# Patient Record
Sex: MALE | Race: WHITE | NOT HISPANIC OR LATINO | Employment: FULL TIME | URBAN - METROPOLITAN AREA
[De-identification: names, ages, dates, MRNs, and addresses within clinical notes are randomized per-mention and may not be internally consistent; named-entity substitution may affect disease eponyms.]

---

## 2019-03-05 ENCOUNTER — OFFICE VISIT (OUTPATIENT)
Dept: GASTROENTEROLOGY | Facility: CLINIC | Age: 49
End: 2019-03-05
Payer: COMMERCIAL

## 2019-03-05 VITALS
SYSTOLIC BLOOD PRESSURE: 100 MMHG | DIASTOLIC BLOOD PRESSURE: 70 MMHG | HEIGHT: 70 IN | HEART RATE: 87 BPM | RESPIRATION RATE: 18 BRPM | WEIGHT: 209 LBS | BODY MASS INDEX: 29.92 KG/M2

## 2019-03-05 DIAGNOSIS — R11.0 NAUSEA: ICD-10-CM

## 2019-03-05 DIAGNOSIS — K62.5 RECTAL BLEEDING: ICD-10-CM

## 2019-03-05 DIAGNOSIS — R10.30 LOWER ABDOMINAL PAIN: Primary | ICD-10-CM

## 2019-03-05 DIAGNOSIS — K92.1 MELENA: ICD-10-CM

## 2019-03-05 DIAGNOSIS — R19.5 LOOSE STOOLS: ICD-10-CM

## 2019-03-05 PROBLEM — R10.32 LLQ PAIN: Status: ACTIVE | Noted: 2019-03-05

## 2019-03-05 PROCEDURE — 99244 OFF/OP CNSLTJ NEW/EST MOD 40: CPT | Performed by: INTERNAL MEDICINE

## 2019-03-05 RX ORDER — MELOXICAM 15 MG/1
15 TABLET ORAL DAILY
Refills: 1 | COMMUNITY
Start: 2019-02-04 | End: 2019-07-17

## 2019-03-05 NOTE — LETTER
March 5, 2019     David Underwood DO  [address]  [facility] [address]    Patient: Stefania Smith Sr   YOB: 1970   Date of Visit: 3/5/2019       Dear Dr Grecia Martínez: Thank you for referring Beka Hernandez Sr to me for evaluation  Below are my notes for this consultation  If you have questions, please do not hesitate to call me  I look forward to following your patient along with you  Sincerely,        Ted Sifuentes MD        CC: No Recipients  Ted Sifuentes MD  3/5/2019  3:56 PM  Incomplete  [facility]    Dear   Dr Grecia Martínez,    I had the pleasure of seeing your patient Nanette Smith in the office today and I thank you for this kind referral        Chief Complaint:  Abdominal pain and  bleeding      HPI:  Nanette Smith is a 54 y o  male who presents with  A history of rectal bleeding and abdominal pain  According to the patient his GI history actually goes back to most of his life  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  He was scoped 11 years ago and told to have probable IBS  He did not take the medications that will offer him  He has frequent and longstanding intermittent loose stool as well as chronic nausea  These do appear to be diet related, especially coffee and such substances  However on Christmas the patient had a left lower quadrant and left mid quadrant nonradiating pain  On new year's Day he passed a large blood clot rectally  Patient does not have any dysphagia, nausea or vomiting, weight loss, change in bowel habits, or any other definitive exacerbating or remitting factors for his discomfort  The patient has had melanotic stool on occasion  He does take ibuprofen  He has no other significant GI complaints  No studies have been done quite some time  Review of Systems:   Constitutional: No fever or chills, feels well, no tiredness, no recent weight gain or weight loss     HENT: No complaints of earache, no hearing loss, no nosebleeds, no nasal discharge, no sore throat, no hoarseness  Eyes: No complaints of eye pain, no red eyes, no discharge from eyes, no itchy eyes  Cardiovascular: No complaints of slow heart rate, no fast heart rate, no chest pain, no palpitations, no leg claudication, no lower extremity edema  Respiratory: No complaints of shortness of breath, no wheezing, no cough, no SOB on exertion, no orthopnea  Gastrointestinal: As noted in HPI  Genitourinary: No complaints of dysuria, no incontinence, no hesitancy, no nocturia  Musculoskeletal:   Positive arthralgia, no myalgias, no joint swelling or stiffness, no limb pain or swelling  Neurological: No complaints of headache, no confusion, no convulsions, no numbness or tingling, no dizziness or fainting, no limb weakness, no difficulty walking  Skin: No complaints of skin rash or skin lesions, no itching, no skin wound, no dry skin  Hematological/Lymphatic: No complaints of swollen glands, does not bleed easy  Allergic/Immunologic: No immunocompromised state  Endocrine:  No complaints of polyuria, no polydipsia  Psychiatric/Behavioral: is not suicidal, no sleep disturbances, no anxiety or depression, no change in personality, no emotional problems         Historical Information   Past Medical History:   Diagnosis Date    Asthma      Past Surgical History:   Procedure Laterality Date    ANKLE SURGERY      LEG SURGERY       Social History   Social History     Substance and Sexual Activity   Alcohol Use Never    Frequency: Never     Social History     Substance and Sexual Activity   Drug Use Never     Social History     Tobacco Use   Smoking Status Current Every Day Smoker   Smokeless Tobacco Never Used     Family History   Problem Relation Age of Onset    Parkinsonism Mother     Diabetes Father     Breast cancer Sister     Lung cancer Sister          Current Medications: has a current medication list which includes the following prescription(s): meloxicam        Vital Signs: /70   Pulse 87   Resp 18   Ht 5' 10" (1 778 m)   Wt 94 8 kg (209 lb)   BMI 29 99 kg/m²      Physical Exam:   Constitutional  General Appearance: No acute distress, well appearing and well nourished  Head  Normocephalic  Eyes  Conjunctivae and lids: No swelling, erythema, or discharge  Pupils and irises: Equal, round and reactive to light  Ears, Nose, Mouth, and Throat  External inspection of ears and nose: Normal  Nasal mucosa, septum and turbinates: Normal without edema or erythema/   Oropharynx: Normal with no erythema, edema, exudate or lesions  Neck  Normal range of motion  Neck supple  Cardiovascular  Auscultation of the heart: Normal rate and rhythm, normal S1 and S2 without murmurs  Examination of the extremities for edema and/or varicosities: Normal  Pulmonary/Chest  Respiratory effort: No increased work of breathing or signs of respiratory distress  Auscultation of lungs: Clear to auscultation, equal breath sounds bilaterally, no wheezes, rales, no rhonchi  Abdomen  Abdomen: Non-tender, no masses  Liver and spleen: No hepatomegaly or splenomegaly  Musculoskeletal  Gait and station: normal   Digits and Nails: normal without clubbing or cyanosis  Inspection/palpation of joints, bones, and muscles: Normal  Neurological  No nystagmus or asterixis  Skin  Skin and subcutaneous tissue: Normal without rashes or lesions  Lymphatic  Palpation of the lymph nodes in neck: No lymphadenopathy     Psychiatric  Orientation to person, place and time: Normal   Mood and affect: Normal          Labs:   No results found for: ALT, AST, BUN, CALCIUM, CL, CHOL, CO2, CREATININE, GFRAA, GFRNONAA, HDL, HCT, HGB, HGBA1C, LDL, MG, PHOS, PLT, K, PSA, NA, TRIG, WBC      X-Rays & Procedures:   No orders to display         ______________________________________________________________________      Assessment & Plan:      Amarjit Oconnor was seen today for screening colonoscopy, abdominal pain and rectal bleeding  Diagnoses and all orders for this visit:    Lower abdominal pain    Rectal bleeding    Nausea    Melena    Loose stools       I have taken liberty of scheduling the patient for both EGD and colonoscopy  I will be happy to inform you of his results and any further recommendations  I would like to thank you for allowing me to participate in his care                With warmest regards,    Georges Darden MD, [facility]

## 2019-03-05 NOTE — PROGRESS NOTES
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  He was scoped 11 years ago and told to have probable IBS  He did not take the medications that will offer him  He has frequent and longstanding intermittent loose stool as well as chronic nausea  These do appear to be diet related, especially coffee and such substances  However on Christmas the patient had a left lower quadrant and left mid quadrant nonradiating pain  On new year's Day he passed a large blood clot rectally  Patient does not have any dysphagia, nausea or vomiting, weight loss, change in bowel habits, or any other definitive exacerbating or remitting factors for his discomfort  The patient has had melanotic stool on occasion  He does take ibuprofen  He has no other significant GI complaints  No studies have been done quite some time  Review of Systems:   Constitutional: No fever or chills, feels well, no tiredness, no recent weight gain or weight loss  HENT: No complaints of earache, no hearing loss, no nosebleeds, no nasal discharge, no sore throat, no hoarseness  Eyes: No complaints of eye pain, no red eyes, no discharge from eyes, no itchy eyes  Cardiovascular: No complaints of slow heart rate, no fast heart rate, no chest pain, no palpitations, no leg claudication, no lower extremity edema  Respiratory: No complaints of shortness of breath, no wheezing, no cough, no SOB on exertion, no orthopnea     Gastrointestinal: As noted in HPI  Genitourinary: No complaints of dysuria, no incontinence, no hesitancy, no nocturia  Musculoskeletal:   Positive arthralgia, no myalgias, no joint swelling or stiffness, no limb pain or swelling  Neurological: No complaints of headache, no confusion, no convulsions, no numbness or tingling, no dizziness or fainting, no limb weakness, no difficulty walking  Skin: No complaints of skin rash or skin lesions, no itching, no skin wound, no dry skin  Hematological/Lymphatic: No complaints of swollen glands, does not bleed easy  Allergic/Immunologic: No immunocompromised state  Endocrine:  No complaints of polyuria, no polydipsia  Psychiatric/Behavioral: is not suicidal, no sleep disturbances, no anxiety or depression, no change in personality, no emotional problems  Historical Information   Past Medical History:   Diagnosis Date    Asthma      Past Surgical History:   Procedure Laterality Date    ANKLE SURGERY      LEG SURGERY       Social History   Social History     Substance and Sexual Activity   Alcohol Use Never    Frequency: Never     Social History     Substance and Sexual Activity   Drug Use Never     Social History     Tobacco Use   Smoking Status Current Every Day Smoker   Smokeless Tobacco Never Used     Family History   Problem Relation Age of Onset    Parkinsonism Mother     Diabetes Father     Breast cancer Sister     Lung cancer Sister          Current Medications: has a current medication list which includes the following prescription(s): meloxicam        Vital Signs: /70   Pulse 87   Resp 18   Ht 5' 10" (1 778 m)   Wt 94 8 kg (209 lb)   BMI 29 99 kg/m²     Physical Exam:   Constitutional  General Appearance: No acute distress, well appearing and well nourished  Head  Normocephalic  Eyes  Conjunctivae and lids: No swelling, erythema, or discharge  Pupils and irises: Equal, round and reactive to light     Ears, Nose, Mouth, and Throat  External inspection of ears and nose: Normal  Nasal mucosa, septum and turbinates: Normal without edema or erythema/   Oropharynx: Normal with no erythema, edema, exudate or lesions  Neck  Normal range of motion  Neck supple  Cardiovascular  Auscultation of the heart: Normal rate and rhythm, normal S1 and S2 without murmurs  Examination of the extremities for edema and/or varicosities: Normal  Pulmonary/Chest  Respiratory effort: No increased work of breathing or signs of respiratory distress  Auscultation of lungs: Clear to auscultation, equal breath sounds bilaterally, no wheezes, rales, no rhonchi  Abdomen  Abdomen: Non-tender, no masses  Liver and spleen: No hepatomegaly or splenomegaly  Musculoskeletal  Gait and station: normal   Digits and Nails: normal without clubbing or cyanosis  Inspection/palpation of joints, bones, and muscles: Normal  Neurological  No nystagmus or asterixis  Skin  Skin and subcutaneous tissue: Normal without rashes or lesions  Lymphatic  Palpation of the lymph nodes in neck: No lymphadenopathy  Psychiatric  Orientation to person, place and time: Normal   Mood and affect: Normal          Labs:   No results found for: ALT, AST, BUN, CALCIUM, CL, CHOL, CO2, CREATININE, GFRAA, GFRNONAA, HDL, HCT, HGB, HGBA1C, LDL, MG, PHOS, PLT, K, PSA, NA, TRIG, WBC      X-Rays & Procedures:   No orders to display         ______________________________________________________________________      Assessment & Plan:      Zainab Malik was seen today for screening colonoscopy, abdominal pain and rectal bleeding  Diagnoses and all orders for this visit:    Lower abdominal pain    Rectal bleeding    Nausea    Melena    Loose stools       I have taken liberty of scheduling the patient for both EGD and colonoscopy  I will be happy to inform you of his results and any further recommendations  I would like to thank you for allowing me to participate in his care                With warmest regards,    Naga Pennington MD, [facility]

## 2019-03-07 ENCOUNTER — TELEPHONE (OUTPATIENT)
Dept: GASTROENTEROLOGY | Facility: CLINIC | Age: 49
End: 2019-03-07

## 2019-03-07 NOTE — TELEPHONE ENCOUNTER
Pt's wife Ruston called stating that since the pt saw Dr Dre Herrera on Tuesday 03/05 the pt's abdominal pain has become worse to the point where he is having nausea and vomiting   He wanted to know if there is something otc he can [phone]

## 2019-03-08 ENCOUNTER — APPOINTMENT (EMERGENCY)
Dept: CT IMAGING | Facility: HOSPITAL | Age: 49
DRG: 392 | End: 2019-03-08
Payer: COMMERCIAL

## 2019-03-08 ENCOUNTER — TELEPHONE (OUTPATIENT)
Dept: GASTROENTEROLOGY | Facility: CLINIC | Age: 49
End: 2019-03-08

## 2019-03-08 ENCOUNTER — HOSPITAL ENCOUNTER (INPATIENT)
Facility: HOSPITAL | Age: 49
LOS: 4 days | Discharge: HOME | DRG: 392 | End: 2019-03-12
Attending: EMERGENCY MEDICINE | Admitting: SURGERY
Payer: COMMERCIAL

## 2019-03-08 DIAGNOSIS — K66.8 PERITONEAL FREE AIR: ICD-10-CM

## 2019-03-08 DIAGNOSIS — K57.20 PERFORATED DIVERTICULUM OF LARGE INTESTINE: Primary | ICD-10-CM

## 2019-03-08 LAB
ABO GROUP BLD: NORMAL
ALBUMIN SERPL BCP-MCNC: 3.8 G/DL (ref 3.5–5)
ALP SERPL-CCNC: 92 U/L (ref 46–116)
ALT SERPL W P-5'-P-CCNC: 37 U/L (ref 12–78)
ANION GAP SERPL CALCULATED.3IONS-SCNC: 10 MMOL/L (ref 4–13)
APTT PPP: 33 SECONDS (ref 26–38)
AST SERPL W P-5'-P-CCNC: 15 U/L (ref 5–45)
BACTERIA UR QL AUTO: ABNORMAL /HPF
BASOPHILS # BLD AUTO: 0.03 THOUSANDS/ΜL (ref 0–0.1)
BASOPHILS NFR BLD AUTO: 0 % (ref 0–1)
BILIRUB SERPL-MCNC: 1.4 MG/DL (ref 0.2–1)
BILIRUB UR QL STRIP: NEGATIVE
BLD GP AB SCN SERPL QL: NEGATIVE
BUN SERPL-MCNC: 21 MG/DL (ref 5–25)
CALCIUM SERPL-MCNC: 9.2 MG/DL (ref 8.3–10.1)
CHLORIDE SERPL-SCNC: 97 MMOL/L (ref 100–108)
CLARITY UR: CLEAR
CO2 SERPL-SCNC: 27 MMOL/L (ref 21–32)
COLOR UR: YELLOW
CREAT SERPL-MCNC: 1.15 MG/DL (ref 0.6–1.3)
EOSINOPHIL # BLD AUTO: 0.01 THOUSAND/ΜL (ref 0–0.61)
EOSINOPHIL NFR BLD AUTO: 0 % (ref 0–6)
ERYTHROCYTE [DISTWIDTH] IN BLOOD BY AUTOMATED COUNT: 13 % (ref 11.6–15.1)
GFR SERPL CREATININE-BSD FRML MDRD: 75 ML/MIN/1.73SQ M
GLUCOSE SERPL-MCNC: 108 MG/DL (ref 65–140)
GLUCOSE UR STRIP-MCNC: NEGATIVE MG/DL
HCT VFR BLD AUTO: 44.8 % (ref 36.5–49.3)
HGB BLD-MCNC: 15.2 G/DL (ref 12–17)
HGB UR QL STRIP.AUTO: ABNORMAL
IMM GRANULOCYTES # BLD AUTO: 0.06 THOUSAND/UL (ref 0–0.2)
IMM GRANULOCYTES NFR BLD AUTO: 0 % (ref 0–2)
INR PPP: 1.24 (ref 0.86–1.17)
KETONES UR STRIP-MCNC: ABNORMAL MG/DL
LACTATE SERPL-SCNC: 1.2 MMOL/L (ref 0.5–2)
LEUKOCYTE ESTERASE UR QL STRIP: NEGATIVE
LIPASE SERPL-CCNC: 54 U/L (ref 73–393)
LYMPHOCYTES # BLD AUTO: 2.05 THOUSANDS/ΜL (ref 0.6–4.47)
LYMPHOCYTES NFR BLD AUTO: 12 % (ref 14–44)
MCH RBC QN AUTO: 30.1 PG (ref 26.8–34.3)
MCHC RBC AUTO-ENTMCNC: 33.9 G/DL (ref 31.4–37.4)
MCV RBC AUTO: 89 FL (ref 82–98)
MONOCYTES # BLD AUTO: 1.42 THOUSAND/ΜL (ref 0.17–1.22)
MONOCYTES NFR BLD AUTO: 8 % (ref 4–12)
MUCOUS THREADS UR QL AUTO: ABNORMAL
NEUTROPHILS # BLD AUTO: 14.32 THOUSANDS/ΜL (ref 1.85–7.62)
NEUTS SEG NFR BLD AUTO: 80 % (ref 43–75)
NITRITE UR QL STRIP: POSITIVE
NON-SQ EPI CELLS URNS QL MICRO: ABNORMAL /HPF
NRBC BLD AUTO-RTO: 0 /100 WBCS
PH UR STRIP.AUTO: 6.5 [PH]
PLATELET # BLD AUTO: 318 THOUSANDS/UL (ref 149–390)
PMV BLD AUTO: 9.4 FL (ref 8.9–12.7)
POTASSIUM SERPL-SCNC: 4 MMOL/L (ref 3.5–5.3)
PROT SERPL-MCNC: 8.1 G/DL (ref 6.4–8.2)
PROT UR STRIP-MCNC: ABNORMAL MG/DL
PROTHROMBIN TIME: 15.4 SECONDS (ref 11.8–14.2)
RBC # BLD AUTO: 5.05 MILLION/UL (ref 3.88–5.62)
RBC #/AREA URNS AUTO: ABNORMAL /HPF
RH BLD: POSITIVE
SODIUM SERPL-SCNC: 134 MMOL/L (ref 136–145)
SP GR UR STRIP.AUTO: 1.02 (ref 1–1.03)
SPECIMEN EXPIRATION DATE: NORMAL
UROBILINOGEN UR QL STRIP.AUTO: 0.2 E.U./DL
WBC # BLD AUTO: 17.89 THOUSAND/UL (ref 4.31–10.16)
WBC #/AREA URNS AUTO: ABNORMAL /HPF

## 2019-03-08 PROCEDURE — 99285 EMERGENCY DEPT VISIT HI MDM: CPT

## 2019-03-08 PROCEDURE — 96361 HYDRATE IV INFUSION ADD-ON: CPT

## 2019-03-08 PROCEDURE — 99222 1ST HOSP IP/OBS MODERATE 55: CPT | Performed by: SURGERY

## 2019-03-08 PROCEDURE — 86901 BLOOD TYPING SEROLOGIC RH(D): CPT | Performed by: PHYSICIAN ASSISTANT

## 2019-03-08 PROCEDURE — 74177 CT ABD & PELVIS W/CONTRAST: CPT

## 2019-03-08 PROCEDURE — 87591 N.GONORRHOEAE DNA AMP PROB: CPT | Performed by: PHYSICIAN ASSISTANT

## 2019-03-08 PROCEDURE — 36415 COLL VENOUS BLD VENIPUNCTURE: CPT | Performed by: PHYSICIAN ASSISTANT

## 2019-03-08 PROCEDURE — 83605 ASSAY OF LACTIC ACID: CPT | Performed by: PHYSICIAN ASSISTANT

## 2019-03-08 PROCEDURE — 85025 COMPLETE CBC W/AUTO DIFF WBC: CPT | Performed by: PHYSICIAN ASSISTANT

## 2019-03-08 PROCEDURE — 86850 RBC ANTIBODY SCREEN: CPT | Performed by: PHYSICIAN ASSISTANT

## 2019-03-08 PROCEDURE — 96360 HYDRATION IV INFUSION INIT: CPT

## 2019-03-08 PROCEDURE — 81001 URINALYSIS AUTO W/SCOPE: CPT | Performed by: PHYSICIAN ASSISTANT

## 2019-03-08 PROCEDURE — 85610 PROTHROMBIN TIME: CPT | Performed by: PHYSICIAN ASSISTANT

## 2019-03-08 PROCEDURE — 83690 ASSAY OF LIPASE: CPT | Performed by: PHYSICIAN ASSISTANT

## 2019-03-08 PROCEDURE — 80053 COMPREHEN METABOLIC PANEL: CPT | Performed by: PHYSICIAN ASSISTANT

## 2019-03-08 PROCEDURE — 86900 BLOOD TYPING SEROLOGIC ABO: CPT | Performed by: PHYSICIAN ASSISTANT

## 2019-03-08 PROCEDURE — 85730 THROMBOPLASTIN TIME PARTIAL: CPT | Performed by: PHYSICIAN ASSISTANT

## 2019-03-08 PROCEDURE — 87491 CHLMYD TRACH DNA AMP PROBE: CPT | Performed by: PHYSICIAN ASSISTANT

## 2019-03-08 RX ORDER — MORPHINE SULFATE 4 MG/ML
3 INJECTION, SOLUTION INTRAMUSCULAR; INTRAVENOUS ONCE
Status: COMPLETED | OUTPATIENT
Start: 2019-03-08 | End: 2019-03-08

## 2019-03-08 RX ORDER — ACETAMINOPHEN 325 MG/1
650 TABLET ORAL EVERY 6 HOURS PRN
Status: DISCONTINUED | OUTPATIENT
Start: 2019-03-08 | End: 2019-03-12 | Stop reason: HOSPADM

## 2019-03-08 RX ORDER — NICOTINE 21 MG/24HR
1 PATCH, TRANSDERMAL 24 HOURS TRANSDERMAL DAILY
Status: DISCONTINUED | OUTPATIENT
Start: 2019-03-08 | End: 2019-03-12 | Stop reason: HOSPADM

## 2019-03-08 RX ORDER — ALBUTEROL SULFATE 90 UG/1
2 AEROSOL, METERED RESPIRATORY (INHALATION) EVERY 6 HOURS PRN
COMMUNITY

## 2019-03-08 RX ORDER — SODIUM CHLORIDE 9 MG/ML
125 INJECTION, SOLUTION INTRAVENOUS CONTINUOUS
Status: DISCONTINUED | OUTPATIENT
Start: 2019-03-08 | End: 2019-03-08

## 2019-03-08 RX ORDER — ONDANSETRON 2 MG/ML
4 INJECTION INTRAMUSCULAR; INTRAVENOUS EVERY 6 HOURS PRN
Status: DISCONTINUED | OUTPATIENT
Start: 2019-03-08 | End: 2019-03-12 | Stop reason: HOSPADM

## 2019-03-08 RX ORDER — NALOXONE HYDROCHLORIDE 0.4 MG/ML
0.04 INJECTION, SOLUTION INTRAMUSCULAR; INTRAVENOUS; SUBCUTANEOUS
Status: DISCONTINUED | OUTPATIENT
Start: 2019-03-08 | End: 2019-03-12 | Stop reason: HOSPADM

## 2019-03-08 RX ORDER — SODIUM CHLORIDE 9 MG/ML
125 INJECTION, SOLUTION INTRAVENOUS CONTINUOUS
Status: DISCONTINUED | OUTPATIENT
Start: 2019-03-08 | End: 2019-03-10

## 2019-03-08 RX ADMIN — NICOTINE 1 PATCH: 21 PATCH TRANSDERMAL at 16:13

## 2019-03-08 RX ADMIN — SODIUM CHLORIDE 1000 ML: 0.9 INJECTION, SOLUTION INTRAVENOUS at 12:38

## 2019-03-08 RX ADMIN — IOHEXOL 100 ML: 350 INJECTION, SOLUTION INTRAVENOUS at 13:31

## 2019-03-08 RX ADMIN — MORPHINE SULFATE 2 MG: 2 INJECTION, SOLUTION INTRAMUSCULAR; INTRAVENOUS at 21:42

## 2019-03-08 RX ADMIN — SODIUM CHLORIDE 125 ML/HR: 0.9 INJECTION, SOLUTION INTRAVENOUS at 15:55

## 2019-03-08 RX ADMIN — MORPHINE SULFATE 3 MG: 4 INJECTION INTRAVENOUS at 16:55

## 2019-03-08 RX ADMIN — ENOXAPARIN SODIUM 40 MG: 40 INJECTION SUBCUTANEOUS at 16:13

## 2019-03-08 RX ADMIN — PIPERACILLIN SODIUM,TAZOBACTAM SODIUM 4.5 G: 4; .5 INJECTION, POWDER, FOR SOLUTION INTRAVENOUS at 20:09

## 2019-03-08 RX ADMIN — METRONIDAZOLE 500 MG: 500 INJECTION, SOLUTION INTRAVENOUS at 14:35

## 2019-03-08 RX ADMIN — SODIUM CHLORIDE 125 ML/HR: 0.9 INJECTION, SOLUTION INTRAVENOUS at 16:12

## 2019-03-08 RX ADMIN — PIPERACILLIN SODIUM,TAZOBACTAM SODIUM 3.38 G: 3; .375 INJECTION, POWDER, FOR SOLUTION INTRAVENOUS at 15:55

## 2019-03-08 NOTE — ED PROVIDER NOTES
History  Chief Complaint   Patient presents with    Abdominal Pain     Pt states pain for about a week, fever started last night  Pt states nausea, no vomiting  Pt states fever high of 102 last night  57-year-old male with past medical history significant for asthma presents to the emergency department with chief complaint of lower abdominal pain, bloody stools and fever  Onset of symptoms reported as 1 week ago  Location of symptoms reported as the lower abdomen  Quality is reported as sharp cramping lower abdominal pain associated with occasionally dark kasey and occasionally bright red blood in the stool  Severity reported as moderate  Associated symptoms:  Positive for fevers which developed last night  Positive for nausea  Denies vomiting  Positive for dysuria  Denies hematuria  Modifying factors:  Eating and movement exacerbates pain  Context:  Patient reports he initially developed an episode of rectal bleeding shortly after new year's this year  He reports it resolved after 1 day  On follow-up he was seen by Gastroenterology and was being followed by them with the plan for colonoscopy in the next 2 weeks  Patient reported for the past 1 week he has been experiencing lower abdominal pain  This is been associated with bloody diarrhea  Last night he developed fever and when calling the gastroenterologist's office today was instructed to come to the emergency department for further evaluation of symptoms  Reviewed past visits via [facility]:  No prior visits to this emergency department  History provided by:  Patient and significant other   used: No        Prior to Admission Medications   Prescriptions Last Dose Informant Patient Reported?  Taking?   meloxicam (MOBIC) 15 mg tablet   Yes No   Sig: Take 15 mg by mouth daily      Facility-Administered Medications: None       Past Medical History:   Diagnosis Date    Asthma        Past Surgical History:   Procedure Laterality Date    ANKLE SURGERY      LEG SURGERY         Family History   Problem Relation Age of Onset   Angus Borjas Parkinsonism Mother     Diabetes Father     Breast cancer Sister     Lung cancer Sister      I have reviewed and agree with the history as documented  Social History     Tobacco Use    Smoking status: Current Every Day Smoker    Smokeless tobacco: Never Used   Substance Use Topics    Alcohol use: Never     Frequency: Never    Drug use: Never        Review of Systems   Constitutional: Positive for fever  Negative for activity change, appetite change, chills, diaphoresis and fatigue  HENT: Negative for congestion, dental problem, drooling, ear discharge, ear pain, facial swelling, hearing loss, mouth sores, nosebleeds, postnasal drip, rhinorrhea, sinus pressure, sinus pain, sneezing, sore throat, tinnitus, trouble swallowing and voice change  Eyes: Negative for photophobia, pain, discharge, redness, itching and visual disturbance  Respiratory: Negative for apnea, cough, choking, chest tightness, shortness of breath, wheezing and stridor  Cardiovascular: Negative for chest pain, palpitations and leg swelling  Gastrointestinal: Positive for abdominal pain, blood in stool, diarrhea and nausea  Negative for abdominal distention, constipation, rectal pain and vomiting  Endocrine: Negative for cold intolerance, heat intolerance, polydipsia, polyphagia and polyuria  Genitourinary: Positive for dysuria  Negative for decreased urine volume, difficulty urinating, flank pain, frequency, hematuria, testicular pain and urgency  Musculoskeletal: Negative for arthralgias, back pain, gait problem, joint swelling, myalgias, neck pain and neck stiffness  Skin: Negative for color change, pallor, rash and wound  Allergic/Immunologic: Negative for environmental allergies, food allergies and immunocompromised state     Neurological: Negative for dizziness, tremors, seizures, syncope, facial asymmetry, speech difficulty, weakness, light-headedness, numbness and headaches  Hematological: Negative for adenopathy  Does not bruise/bleed easily  Psychiatric/Behavioral: Negative for agitation, confusion, decreased concentration and hallucinations  The patient is not nervous/anxious  All other systems reviewed and are negative  Physical Exam  Physical Exam   Constitutional: He is oriented to person, place, and time  He appears well-developed and well-nourished  No distress  /65 (BP Location: Right arm)   Pulse 99   Temp 99 7 °F (37 6 °C) (Oral)   Resp 17   Ht 5' 10" (1 778 m)   Wt 95 3 kg (210 lb)   BMI 30 13 kg/m²    HENT:   Head: Normocephalic and atraumatic  Right Ear: External ear normal    Left Ear: External ear normal    Nose: Nose normal    Mouth/Throat: Oropharynx is clear and moist  No oropharyngeal exudate  Eyes: Pupils are equal, round, and reactive to light  Conjunctivae and EOM are normal  Right eye exhibits no discharge  Left eye exhibits no discharge  No scleral icterus  Neck: Normal range of motion  Neck supple  No JVD present  No tracheal deviation present  No thyromegaly present  Cardiovascular: Normal rate, regular rhythm and intact distal pulses  Pulmonary/Chest: Effort normal and breath sounds normal  No stridor  No respiratory distress  He has no wheezes  He has no rales  He exhibits no tenderness  Abdominal: Soft  Bowel sounds are normal  He exhibits no distension and no mass  There is no tenderness  There is no rebound and no guarding  Musculoskeletal: Normal range of motion  He exhibits no edema, tenderness or deformity  Lymphadenopathy:     He has no cervical adenopathy  Neurological: He is alert and oriented to person, place, and time  He displays normal reflexes  No cranial nerve deficit or sensory deficit  He exhibits normal muscle tone  Coordination normal    Skin: Skin is warm and dry  Capillary refill takes less than 2 seconds   No rash noted  He is not diaphoretic  No erythema  No pallor  Psychiatric: He has a normal mood and affect  His behavior is normal  Judgment and thought content normal    Nursing note and vitals reviewed        Vital Signs  ED Triage Vitals   Temperature Pulse Respirations Blood Pressure SpO2   03/08/19 1156 03/08/19 1156 03/08/19 1156 03/08/19 1156 03/08/19 1420   99 7 °F (37 6 °C) 99 17 137/65 99 %      Temp Source Heart Rate Source Patient Position - Orthostatic VS BP Location FiO2 (%)   03/08/19 1156 03/08/19 1156 03/08/19 1156 03/08/19 1156 --   Oral Monitor Sitting Right arm       Pain Score       03/08/19 1156       6           Vitals:    03/08/19 1156 03/08/19 1420   BP: 137/65 132/87   Pulse: 99 88   Patient Position - Orthostatic VS: Sitting        Visual Acuity      ED Medications  Medications   piperacillin-tazobactam (ZOSYN) 4 5 g in sodium chloride 0 9 % 100 mL IVPB (has no administration in time range)   sodium chloride 0 9 % infusion (125 mL/hr Intravenous New Bag 3/8/19 1612)   ondansetron (ZOFRAN) injection 4 mg (has no administration in time range)   nicotine (NICODERM CQ) 21 mg/24 hr TD 24 hr patch 1 patch (1 patch Transdermal Medication Applied 3/8/19 1613)   enoxaparin (LOVENOX) subcutaneous injection 40 mg (40 mg Subcutaneous Given 3/8/19 1613)   morphine injection 2 mg (has no administration in time range)   naloxone [facility]) injection 0 04 mg (has no administration in time range)   sodium chloride 0 9 % bolus 1,000 mL (0 mL Intravenous Stopped 3/8/19 1436)   iohexol (OMNIPAQUE) 350 MG/ML injection (MULTI-DOSE) 100 mL (100 mL Intravenous Given 3/8/19 1331)   piperacillin-tazobactam (ZOSYN) 3 375 g in sodium chloride 0 9 % 50 mL IVPB (3 375 g Intravenous New Bag 3/8/19 1555)   metroNIDAZOLE (FLAGYL) IVPB (premix) 500 mg (0 mg Intravenous Stopped 3/8/19 1525)   morphine (PF) 4 mg/mL injection 3 mg (3 mg Intravenous Given 3/8/19 5285)       Diagnostic Studies  Results Reviewed     Procedure Component Value Units Date/Time    Urine Microscopic [[id number]]  (Abnormal) Collected:  03/08/19 1347    Lab Status:  Final result Specimen:  Urine, Clean Catch Updated:  03/08/19 1414     RBC, UA 0-1 /hpf      WBC, UA 1-2 /hpf      Epithelial Cells Occasional /hpf      Bacteria, UA Occasional /hpf      MUCUS THREADS Occasional    UA w Reflex to Microscopic w Reflex to Culture [[id number]]  (Abnormal) Collected:  03/08/19 1347    Lab Status:  Final result Specimen:  Urine, Clean Catch Updated:  03/08/19 1411     Color, UA Yellow     Clarity, UA Clear     Specific [place], UA 1 020     pH, UA 6 5     Leukocytes, UA Negative     Nitrite, UA Positive     Protein, UA 30 (1+) mg/dl      Glucose, UA Negative mg/dl      Ketones, UA 15 (1+) mg/dl      Urobilinogen, UA 0 2 E U /dl      Bilirubin, UA Negative     Blood, UA Trace-Intact    Chlamydia/GC amplified DNA by PCR [[id number]] Collected:  03/08/19 1347    Lab Status: In process Specimen:  Urine, Other Updated:  03/08/19 1359    Comprehensive metabolic panel [[id number]]  (Abnormal) Collected:  03/08/19 1237    Lab Status:  Final result Specimen:  Blood from Arm, Right Updated:  03/08/19 1310     Sodium 134 mmol/L      Potassium 4 0 mmol/L      Chloride 97 mmol/L      CO2 27 mmol/L      ANION GAP 10 mmol/L      BUN 21 mg/dL      Creatinine 1 15 mg/dL      Glucose 108 mg/dL      Calcium 9 2 mg/dL      AST 15 U/L      ALT 37 U/L      Alkaline Phosphatase 92 U/L      Total Protein 8 1 g/dL      Albumin 3 8 g/dL      Total Bilirubin 1 40 mg/dL      eGFR 75 ml/min/1 73sq m     Narrative:       National Kidney Disease Education Program recommendations are as follows:  GFR calculation is accurate only with a steady state creatinine  Chronic Kidney disease less than 60 ml/min/1 73 sq  meters  Kidney failure less than 15 ml/min/1 73 sq  meters      Lipase [[id number]]  (Abnormal) Collected:  03/08/19 1237    Lab Status:  Final result Specimen:  Blood from Arm, Right Updated:  03/08/19 1310 Lipase 54 u/L     Lactic acid, plasma [[id number]]  (Normal) Collected:  03/08/19 1237    Lab Status:  Final result Specimen:  Blood from Arm, Right Updated:  03/08/19 1302     LACTIC ACID 1 2 mmol/L     Narrative:       Result may be elevated if tourniquet was used during collection  APTT [[id number]]  (Normal) Collected:  03/08/19 1237    Lab Status:  Final result Specimen:  Blood from Arm, Right Updated:  03/08/19 1256     PTT 33 seconds     Protime-INR [[id number]]  (Abnormal) Collected:  03/08/19 1237    Lab Status:  Final result Specimen:  Blood from Arm, Right Updated:  03/08/19 1256     Protime 15 4 seconds      INR 1 24    CBC and differential [[id number]]  (Abnormal) Collected:  03/08/19 1236    Lab Status:  Final result Specimen:  Blood from Arm, Right Updated:  03/08/19 1244     WBC 17 89 Thousand/uL      RBC 5 05 Million/uL      Hemoglobin 15 2 g/dL      Hematocrit 44 8 %      MCV 89 fL      MCH 30 1 pg      MCHC 33 9 g/dL      RDW 13 0 %      MPV 9 4 fL      Platelets 520 Thousands/uL      nRBC 0 /100 WBCs      Neutrophils Relative 80 %      Immat GRANS % 0 %      Lymphocytes Relative 12 %      Monocytes Relative 8 %      Eosinophils Relative 0 %      Basophils Relative 0 %      Neutrophils Absolute 14 32 Thousands/µL      Immature Grans Absolute 0 06 Thousand/uL      Lymphocytes Absolute 2 05 Thousands/µL      Monocytes Absolute 1 42 Thousand/µL      Eosinophils Absolute 0 01 Thousand/µL      Basophils Absolute 0 03 Thousands/µL                  CT abdomen pelvis with contrast   Final Result by Julius Villanueva MD (03/08 1419)   1  Moderate amount of free intraperitoneal air  There is sigmoid bowel wall thickening in an area of multiple diverticula with adjacent pericolonic fat stranding (coronal images 68 through 76)  Findings are suspicious for perforated diverticulitis  2   Prominent fluid-filled loops of small bowel compatible with ileus               I personally discussed this study with Dr Lily Hwang on 3/8/2019 at 2:17 PM                Workstation performed: [id number]                    Procedures  Procedures       Phone Contacts  ED Phone Contact    ED Course  ED Course as of Mar 08 1701   Fri Mar 08, 2019   1417 Ct results called to me now - positive for free air, appears consistent with perforated diverticulum  Antibiotics ordered, surgery paged  1431 Ct abd/pelvis results reviewed: FINDINGS:    ABDOMEN    LOWER CHEST:  Atelectatic changes are noted at the lung bases       LIVER/BILIARY TREE:  One or more subcentimeter sharply circumscribed low-density hepatic lesion(s) are noted, too small to accurately characterize, but statistically most likely to represent subcentimeter hepatic cysts   No suspicious solid hepatic   lesion is identified   Hepatic contours are normal   No biliary dilatation  GALLBLADDER:  No calcified gallstones  No pericholecystic inflammatory change  SPLEEN:  Unremarkable  PANCREAS:  Unremarkable  ADRENAL GLANDS:  Unremarkable  KIDNEYS/URETERS:  Unremarkable  No hydronephrosis  STOMACH AND BOWEL: Gerardine Enriquez are prominent fluid-filled loops of small bowel compatible with ileus   There is thickening of the sigmoid colon with adjacent pericolonic fat stranding, most prominent on coronal images 68 through 76  APPENDIX:  No findings to suggest appendicitis  ABDOMINOPELVIC CAVITY: Gerardine Enriquez is a moderate amount of intraperitoneal free air under the hemidiaphragms   No significant adenopathy   Trace fluid in the pelvis  VESSELS:  Unremarkable for patient's age  PELVIS    REPRODUCTIVE ORGANS:  Unremarkable for patient's age  URINARY BLADDER:  Unremarkable  ABDOMINAL WALL/INGUINAL REGIONS:  Unremarkable  OSSEOUS STRUCTURES:  No acute fracture or destructive osseous lesion    Impression:    1   Moderate amount of free intraperitoneal air   There is sigmoid bowel wall thickening in an area of multiple diverticula with adjacent pericolonic fat stranding (coronal images 68 through 76)   Findings are suspicious for perforated diverticulitis  2   Prominent fluid-filled loops of small bowel compatible with ileus  Lab results remarkable for white blood cell count elevated at 17 8  Hemoglobin of 15 2 and hematocrit of 44 8 are normal   No anemia  1432 Case discussed with Dr Ly Ferrara by telephone  MDM  Number of Diagnoses or Management Options  Perforated diverticulum of large intestine: new and requires workup  Peritoneal free air: new and requires workup  Diagnosis management comments: Differential diagnosis includes but is not limited to appendicitis, diverticulitis, gastroenteritis, gastritis, cholecystitis, pancreatitis, mesenteric adenitis, kidney stone, pyelonephritis, UTI  Plan workup including labs, ct scan abd/pelvis, check ua, add type and screen  Lab results reviewed  Urinalysis remarkable for positive nitrites, trace blood  INR elevated at 1 2  CMP reviewed: NA low at 134, Cl low at 97  inr elevated at 1 24  Lactate normal at 1 2  CBC remarkable for elevated WBC of 17 8  CT abd/pelvis radiology report reviewed  1   Moderate amount of free intraperitoneal air   There is sigmoid bowel wall thickening in an area of multiple diverticula with adjacent pericolonic fat stranding (coronal images 68 through 76)   Findings are suspicious for perforated diverticulitis  2   Prominent fluid-filled loops of small bowel compatible with ileus  ED course:  Patient with abdominal pain, dysuria and blood in stools for the past week  Workup demonstrated perforated diverticulitis with free air  Labs showed elevated white blood cell count within normal lactate  Discussed results with patient and spouse at bedside  Case discussed with Dr Gomez High, general surgery via telephone  Will be in to see patient  Will require admission  Zosyn and Flagyl started intravenously for intra-abdominal infection  Discussed plan of evaluation by surgeon and admission for further treatment with patient and spouse at bedside  The critical care time is separate of other billable procedures, treating other patients, and any teaching time  The critical care time was for: obtaining history from patient or surrogate, development of treatment plan with patient or surrogate, discussion with any applicable consultants, examination of the patient,ordering and performing treatments and interventions, ordering and reviewing any applicable laboratory or radiographic studies, reassessment of the patient for response to treatment, reviewing any pertinent and available old records, documentation time  The critical care time was necessary to prevent deterioration of the following organ systems: infectious disease/intraabdominal infection/perforation with free air/peritoneum      Time spent (in minutes):100                     Amount and/or Complexity of Data Reviewed  Clinical lab tests: ordered and reviewed  Tests in the radiology section of CPT®: ordered and reviewed  Obtain history from someone other than the patient: yes (spouse)  Review and summarize past medical records: yes  Discuss the patient with other providers: yes  Independent visualization of images, tracings, or specimens: yes    Patient Progress  Patient progress: other (comment) (Critical )      Disposition  Final diagnoses:   Perforated diverticulum of large intestine   Peritoneal free air     Time reflects when diagnosis was documented in both MDM as applicable and the Disposition within this note     Time User Action Codes Description Comment    3/8/2019  2:30 PM Nina Bledsoe Add [K57 20] Perforated diverticulum of large intestine     3/8/2019  2:30 PM Nina Bledsoe Add [K66 8] Peritoneal free air       ED Disposition     ED Disposition Condition Date/Time Comment    Admit Stable Fri Mar 8, 2019  2:30 PM Case was discussed with Dr Darlene Guerrero and the patient's admission status was agreed to be Admission Status: inpatient status to the service of Dr Ivy Olmos   Follow-up Information    None         Patient's Medications   Discharge Prescriptions    No medications on file     No discharge procedures on file      ED Provider  Electronically Signed by           Bar Monique PA-C  03/08/19 0705

## 2019-03-08 NOTE — H&P
GENERAL SURGERY HISTORY AND PHYSICAL     Cyndi Vargas Sr 54 y o  male MRN: [id number]  Unit/Bed#: ED 09 Encounter: [id number]      Assessment/Plan   Patient Active Problem List   Diagnosis    Melena    Nausea    LLQ pain    Rectal bleed    Loose stools    Perforation of sigmoid colon due to diverticulitis     Patient with CT findings concerning for perforated sigmoid diverticulitis  Given patient's non peritoneal exam no obvious focal fluid collection I suspect most likely partially contained perforation  The diffuse flecks of free air are definitely concerning, but given patient's relatively unimpressive exam I will attempt a short course of observation and conservative measures  Cautioned patient that given the severity of the findings there is a very high possibility he will require surgical intervention  Given the extreme situation only safe option would be a Talya's procedure  Will repeat labs in AM, continue to monitor vitals  Unless patient shows significant improvement may have to proceed with surgical intervention tomorrow  Chief Complaint lower abdominal pain    HPI: Odessia Schaumann is a 54y o  year old male who presents with lower abdominal pain  Patient has had symptoms for approximately 1 week  Had fevers last night  Patient was planning on having colonoscopy, had had some blood per rectum earlier this year  Spoke with GI last night regarding a fever of 102, instructed in to seek immediate evaluation  Patient has had no previous abdominal surgeries  Patient had previous orthopedic interventions of the left lower extremity  Patient is a current smoker 1 pack per day denies any significant alcohol intake no drug allergies  Denies any daily bleeding or bruising issues    No other significant medical problems or symptoms        ROS:  12 Point ROS reviewed and negative except for:  Lower abdominal pain nausea, fever    Historical Information   Past Medical History: Diagnosis Date    Asthma      Past Surgical History:   Procedure Laterality Date    ANKLE SURGERY      LEG SURGERY       Social History   Social History     Substance and Sexual Activity   Alcohol Use Never    Frequency: Never     Social History     Substance and Sexual Activity   Drug Use Never     Social History     Tobacco Use   Smoking Status Current Every Day Smoker   Smokeless Tobacco Never Used     Family History: no pertinent family history  No Known Allergies  Meds/Allergies   all current active meds have been reviewed      Objective   Vitals: Blood pressure 132/87, pulse 88, temperature (!) 100 6 °F (38 1 °C), temperature source Oral, resp  rate 17, height 5' 10" (1 778 m), weight 95 3 kg (210 lb), SpO2 99 %  ,Body mass index is 30 13 kg/m²  Intake/Output Summary (Last 24 hours) at 3/8/2019 1523  Last data filed at 3/8/2019 1436  Gross per 24 hour   Intake 1000 ml   Output    Net 1000 ml     Invasive Devices     Peripheral Intravenous Line            Peripheral IV 03/08/19 Right Antecubital less than 1 day                Physical Exam:    General appearance: Awake alert oriented no acute distress  HEENT: Sclera clear, anicterus, no discharge  Neck: Trachea is midline, no adenopathy  Lungs: No respiratory distress, clear to auscultation bilaterally  Heart[de-identified] RRR  Abdomen: soft, nondistended, tender to palpation in the lower quadrants no significant tenderness in the superior abdomen  Patient has some voluntary guarding, while distracted no significant involuntary guarding that I can appreciate no diffuse rebound guarding or peritoneal signs  Extremities: No edema, nontender  Skin:No rashes or lesions  Neurologic: No weakness or loss of sensation  Psych: Affect appropriate    Imaging:  CT shows inflammatory changes of the loop of sigmoid colon with extraluminal air  There are small specks of extraluminal air throughout the peritoneal cavity  No significant fluid collections      Labs:  Significant leukocytosis 17,000, normal lactate      Judith Carvalho MD  3/8/2019

## 2019-03-08 NOTE — TELEPHONE ENCOUNTER
Terry pt-  Patient scheld for a colonoscopy on 03/12  Yennifer Phoenixjuan He has developed a fever 102 and has pain in his abdomen with diarrhea     Uses: [phone]  Please phone   Purcell Sacks [phone] to advise

## 2019-03-08 NOTE — ED NOTES
Family at bedside  Pt states colonoscopy scheduled next Tuesday for blood in stool  Pt states tylenol at 7am this morning for fever    Pt stated encouraged to come in by MD Pascale Martinez RN  03/08/19 1200

## 2019-03-08 NOTE — TELEPHONE ENCOUNTER
Spoke with patients wife  Pt h/o abdominal pain, rectal bleeding    Patient has a fever 102F, with severe lower abdominal pain  Advised patient go to ED for evaluation  He will go today  COLON scheduled 3/12

## 2019-03-09 LAB
ANION GAP SERPL CALCULATED.3IONS-SCNC: 11 MMOL/L (ref 4–13)
BASOPHILS # BLD AUTO: 0.02 THOUSANDS/ΜL (ref 0–0.1)
BASOPHILS NFR BLD AUTO: 0 % (ref 0–1)
BUN SERPL-MCNC: 22 MG/DL (ref 5–25)
CA-I BLD-SCNC: 1.09 MMOL/L (ref 1.12–1.32)
CALCIUM SERPL-MCNC: 8.2 MG/DL (ref 8.3–10.1)
CHLORIDE SERPL-SCNC: 103 MMOL/L (ref 100–108)
CO2 SERPL-SCNC: 23 MMOL/L (ref 21–32)
CREAT SERPL-MCNC: 1.01 MG/DL (ref 0.6–1.3)
EOSINOPHIL # BLD AUTO: 0 THOUSAND/ΜL (ref 0–0.61)
EOSINOPHIL NFR BLD AUTO: 0 % (ref 0–6)
ERYTHROCYTE [DISTWIDTH] IN BLOOD BY AUTOMATED COUNT: 12.7 % (ref 11.6–15.1)
GFR SERPL CREATININE-BSD FRML MDRD: 88 ML/MIN/1.73SQ M
GLUCOSE SERPL-MCNC: 104 MG/DL (ref 65–140)
HCT VFR BLD AUTO: 39.5 % (ref 36.5–49.3)
HGB BLD-MCNC: 13.1 G/DL (ref 12–17)
IMM GRANULOCYTES # BLD AUTO: 0.1 THOUSAND/UL (ref 0–0.2)
IMM GRANULOCYTES NFR BLD AUTO: 1 % (ref 0–2)
LACTATE SERPL-SCNC: 1.1 MMOL/L (ref 0.5–2)
LYMPHOCYTES # BLD AUTO: 1.63 THOUSANDS/ΜL (ref 0.6–4.47)
LYMPHOCYTES NFR BLD AUTO: 12 % (ref 14–44)
MAGNESIUM SERPL-MCNC: 1.8 MG/DL (ref 1.6–2.6)
MCH RBC QN AUTO: 29.5 PG (ref 26.8–34.3)
MCHC RBC AUTO-ENTMCNC: 33.2 G/DL (ref 31.4–37.4)
MCV RBC AUTO: 89 FL (ref 82–98)
MONOCYTES # BLD AUTO: 1.28 THOUSAND/ΜL (ref 0.17–1.22)
MONOCYTES NFR BLD AUTO: 9 % (ref 4–12)
NEUTROPHILS # BLD AUTO: 10.84 THOUSANDS/ΜL (ref 1.85–7.62)
NEUTS SEG NFR BLD AUTO: 78 % (ref 43–75)
NRBC BLD AUTO-RTO: 0 /100 WBCS
PLATELET # BLD AUTO: 243 THOUSANDS/UL (ref 149–390)
PMV BLD AUTO: 9.4 FL (ref 8.9–12.7)
POTASSIUM SERPL-SCNC: 3.9 MMOL/L (ref 3.5–5.3)
RBC # BLD AUTO: 4.44 MILLION/UL (ref 3.88–5.62)
SODIUM SERPL-SCNC: 137 MMOL/L (ref 136–145)
WBC # BLD AUTO: 13.87 THOUSAND/UL (ref 4.31–10.16)

## 2019-03-09 PROCEDURE — 82330 ASSAY OF CALCIUM: CPT | Performed by: SURGERY

## 2019-03-09 PROCEDURE — 80048 BASIC METABOLIC PNL TOTAL CA: CPT | Performed by: SURGERY

## 2019-03-09 PROCEDURE — 83605 ASSAY OF LACTIC ACID: CPT | Performed by: SURGERY

## 2019-03-09 PROCEDURE — 85025 COMPLETE CBC W/AUTO DIFF WBC: CPT | Performed by: SURGERY

## 2019-03-09 PROCEDURE — 83735 ASSAY OF MAGNESIUM: CPT | Performed by: SURGERY

## 2019-03-09 PROCEDURE — 99232 SBSQ HOSP IP/OBS MODERATE 35: CPT | Performed by: SURGERY

## 2019-03-09 RX ADMIN — NICOTINE 1 PATCH: 21 PATCH TRANSDERMAL at 08:31

## 2019-03-09 RX ADMIN — ENOXAPARIN SODIUM 40 MG: 40 INJECTION SUBCUTANEOUS at 08:31

## 2019-03-09 RX ADMIN — PIPERACILLIN SODIUM,TAZOBACTAM SODIUM 4.5 G: 4; .5 INJECTION, POWDER, FOR SOLUTION INTRAVENOUS at 01:50

## 2019-03-09 RX ADMIN — MORPHINE SULFATE 2 MG: 2 INJECTION, SOLUTION INTRAMUSCULAR; INTRAVENOUS at 04:30

## 2019-03-09 RX ADMIN — MORPHINE SULFATE 2 MG: 2 INJECTION, SOLUTION INTRAMUSCULAR; INTRAVENOUS at 19:57

## 2019-03-09 RX ADMIN — PIPERACILLIN SODIUM,TAZOBACTAM SODIUM 4.5 G: 4; .5 INJECTION, POWDER, FOR SOLUTION INTRAVENOUS at 08:30

## 2019-03-09 RX ADMIN — MORPHINE SULFATE 2 MG: 2 INJECTION, SOLUTION INTRAMUSCULAR; INTRAVENOUS at 13:58

## 2019-03-09 RX ADMIN — MORPHINE SULFATE 2 MG: 2 INJECTION, SOLUTION INTRAMUSCULAR; INTRAVENOUS at 10:03

## 2019-03-09 RX ADMIN — SODIUM CHLORIDE 125 ML/HR: 0.9 INJECTION, SOLUTION INTRAVENOUS at 16:46

## 2019-03-09 RX ADMIN — SODIUM CHLORIDE 125 ML/HR: 0.9 INJECTION, SOLUTION INTRAVENOUS at 08:37

## 2019-03-09 RX ADMIN — PIPERACILLIN SODIUM,TAZOBACTAM SODIUM 4.5 G: 4; .5 INJECTION, POWDER, FOR SOLUTION INTRAVENOUS at 19:56

## 2019-03-09 RX ADMIN — PIPERACILLIN SODIUM,TAZOBACTAM SODIUM 4.5 G: 4; .5 INJECTION, POWDER, FOR SOLUTION INTRAVENOUS at 15:39

## 2019-03-09 NOTE — UTILIZATION REVIEW
Initial Clinical Review    Admission: Date/Time/Statement: 3/8/19 @ 1431   Orders Placed This Encounter   Procedures    Inpatient Admission     Standing Status:   Standing     Number of Occurrences:   1     Order Specific Question:   Admitting Physician     Answer:   Fabiola Coyne [[id number]]     Order Specific Question:   Level of Care     Answer:   Med Surg [[id number]]     Order Specific Question:   Estimated length of stay     Answer:   More than 2 Midnights     Order Specific Question:   Certification     Answer:   I certify that inpatient services are medically necessary for this patient for a duration of greater than two midnights  See H&P and MD Progress Notes for additional information about the patient's course of treatment  ED: Date/Time/Mode of Arrival:   ED Arrival Information     Expected Arrival Acuity Means of Arrival Escorted By Service Admission Type    - 3/8/2019 11:06 Urgent Walk-In [facility] Surgery-General Urgent    Arrival Complaint    ABDOMINAL PAIN        Chief Complaint:   Chief Complaint   Patient presents with    Abdominal Pain     Pt states pain for about a week, fever started last night  Pt states nausea, no vomiting  Pt states fever high of 102 last night  Assessment/Plan: 55 yo male admitted to from  Home w/ abd pain for the last week   + fevers   CT + perforation of sigmoid colon d/t diverticulitis will use conservative measures, repeat labs in am and monitor    May need Castellanos's procedure if not improved in am     PE : tender superior abd , guarding     ED Vital Signs:   ED Triage Vitals   Temperature Pulse Respirations Blood Pressure SpO2   03/08/19 1156 03/08/19 1156 03/08/19 1156 03/08/19 1156 03/08/19 1420   99 7 °F (37 6 °C) 99 17 137/65 99 %      Temp Source Heart Rate Source Patient Position - Orthostatic VS BP Location FiO2 (%)   03/08/19 1156 03/08/19 1156 03/08/19 1156 03/08/19 1156 --   Oral Monitor Sitting Right arm       Pain Score       03/08/19 1156       6        Wt Readings from Last 1 Encounters:   03/08/19 95 3 kg (210 lb)     Vital Signs (abnormal):   1706 99 6 °F (37 6 °C) 106Abnormal  17 125/65 85 95 % None (Room air) Lying   03/08/19 1700 100 9 °F (38 3 °C)Abnormal           03/08/19 1420 100 6 °F (38 1 °C)Abnormal  88 17            Pertinent Labs/Diagnostic Test Results: na   134, cl  97, total bili  1 40, lipase  54 pt inr  15 4  1 24 wbc  17 89  CT abd- 1   Moderate amount of free intraperitoneal air   There is sigmoid bowel wall thickening in an area of multiple diverticula with adjacent pericolonic fat stranding (coronal images 68 through 76)   Findings are suspicious for perforated diverticulitis  2   Prominent fluid-filled loops of small bowel compatible with ileus    ED Treatment:   Medication Administration from 03/08/2019 1106 to 03/08/2019 1750       Date/Time Order Dose Route Action Action by Comments     03/08/2019 1436 sodium chloride 0 9 % bolus 1,000 mL 0 mL Intravenous Stopped Maricel Warren, SHARON      03/08/2019 1238 sodium chloride 0 9 % bolus 1,000 mL 1,000 mL Intravenous New Bag Maricel Warren, RN      03/08/2019 1331 iohexol (OMNIPAQUE) 350 MG/ML injection (MULTI-DOSE) 100 mL 100 mL Intravenous Given Tesha Hendrix      03/08/2019 1555 piperacillin-tazobactam (ZOSYN) 3 375 g in sodium chloride 0 9 % 50 mL IVPB 3 375 g Intravenous 300 [facility], [facility]      03/08/2019 1525 metroNIDAZOLE (FLAGYL) IVPB (premix) 500 mg 0 mg Intravenous Stopped Clair Prater, RN      03/08/2019 1435 metroNIDAZOLE (FLAGYL) IVPB (premix) 500 mg 500 mg Intravenous New Bag Maricel Warren, RN      03/08/2019 1612 sodium chloride 0 9 % infusion 0 mL/hr Intravenous Stopped Clair Prater, RN      03/08/2019 1555 sodium chloride 0 9 % infusion 125 mL/hr Intravenous [address] , [facility]      03/08/2019 1612 sodium chloride 0 9 % infusion 125 mL/hr Intravenous 300 [facility], [facility]      03/08/2019 1613 nicotine (NICODERM CQ) 21 mg/24 hr TD 24 hr patch 1 patch 1 patch Transdermal Medication Applied Richi Diana RN      03/08/2019 1613 enoxaparin (LOVENOX) subcutaneous injection 40 mg 40 mg Subcutaneous Given Richi Diana RN      03/08/2019 1655 morphine (PF) 4 mg/mL injection 3 mg 3 mg Intravenous Given Richi Diana RN         Past Medical/Surgical History:    Active Ambulatory Problems     Diagnosis Date Noted    Melena 03/05/2019    Nausea 03/05/2019    LLQ pain 03/05/2019    Rectal bleed 03/05/2019    Loose stools 03/05/2019     Past Medical History:   Diagnosis Date    Asthma      Admitting Diagnosis: Peritoneal free air [K66 8]  Abdominal pain [R10 9]  Perforated diverticulum of large intestine [K57 20]  Age/Sex: 54 y o  male  Admission Orders:  Scheduled Meds:   Current Facility-Administered Medications:  acetaminophen 650 mg Oral Q6H PRN     enoxaparin 40 mg Subcutaneous Daily     morphine injection 2 mg Intravenous Q2H PRN x2    naloxone 0 04 mg Intravenous Q1MIN PRN     nicotine 1 patch Transdermal Daily     ondansetron 4 mg Intravenous Q6H PRN     piperacillin-tazobactam 4 5 g Intravenous Q6H  Last Rate: 4 5 g (03/09/19 0830)   sodium chloride 125 mL/hr Intravenous Continuous  Last Rate: 125 mL/hr (03/09/19 0837)     IS   NPO   SCD  I&O   3/9 lactic acid , mg , [place] , bmp, cbc

## 2019-03-09 NOTE — PROGRESS NOTES
Progress Note - Marsha Donald Sr 54 y o  male MRN: [id number]    Unit/Bed#: -01 Encounter: [id number]      Assessment:  Perforated sigmoid diverticulitis  Decrease leukocytosis    Plan:  Continue NPO  IV fluids  IV antibiotics  Monitor vitals closely  Patient is improved, but I did caution him any worsening of symptoms or labs may recur still require surgical intervention      Subjective:   Patient feels better today decreased pain still present able to ambulate urinating with no difficulty increasing flatus no bowel movement  Objective:     Vitals: Blood pressure 118/70, pulse 93, temperature 98 3 °F (36 8 °C), temperature source Oral, resp  rate 18, height 5' 10" (1 778 m), weight 95 3 kg (210 lb), SpO2 99 %  ,Body mass index is 30 13 kg/m²  Intake/Output Summary (Last 24 hours) at 3/9/2019 1209  Last data filed at 3/9/2019 1006  Gross per 24 hour   Intake 3047 92 ml   Output 950 ml   Net 2097 92 ml       Physical Exam:  Awake alert oriented no acute distress  Abdomen is soft nondistended decreased tenderness to palpation compared to the exam yesterday  Pain most prominent the lower abdomen  Patient has decreased voluntary guarding and no significant and diffuse involuntary guarding or rebound        Invasive Devices     Peripheral Intravenous Line            Peripheral IV 03/08/19 Right Antecubital less than 1 day                Lab, Imaging and other studies: Leukocytosis improved at 13 from 17

## 2019-03-09 NOTE — PLAN OF CARE
Problem: Nutrition/Hydration-ADULT  Goal: Nutrient/Hydration intake appropriate for improving, restoring or maintaining nutritional needs  Description  Monitor and assess patient's nutrition/hydration status for malnutrition (ex- brittle hair, bruises, dry skin, pale skin and conjunctiva, muscle wasting, smooth red tongue, and disorientation)  Collaborate with interdisciplinary team and initiate plan and interventions as ordered  Monitor patient's weight and dietary intake as ordered or per policy  Utilize nutrition screening tool and intervene per policy  Determine patient's food preferences and provide high-protein, high-caloric foods as appropriate  INTERVENTIONS:  - Monitor oral intake, urinary output, labs, and treatment plans  - Assess nutrition and hydration status and recommend course of action  - Evaluate amount of meals eaten  - Assist patient with eating if necessary   - Allow adequate time for meals  - Recommend/ encourage appropriate diets, oral nutritional supplements, and vitamin/mineral supplements  - Order, calculate, and assess calorie counts as needed  - Recommend, monitor, and adjust tube feedings and TPN/PPN based on assessed needs  - Assess need for intravenous fluids  - Provide specific nutrition/hydration education as appropriate  - Include patient/family/caregiver in decisions related to nutrition  Outcome: Progressing     Problem: Potential for Falls  Goal: Patient will remain free of falls  Description  INTERVENTIONS:  - Assess patient frequently for physical needs  -  Identify cognitive and physical deficits and behaviors that affect risk of falls    -  [place] fall precautions as indicated by assessment   - Educate patient/family on patient safety including physical limitations  - Instruct patient to call for assistance with activity based on assessment  - Modify environment to reduce risk of injury  - Consider OT/PT consult to assist with strengthening/mobility  Outcome: Progressing

## 2019-03-09 NOTE — PLAN OF CARE
Problem: Nutrition/Hydration-ADULT  Goal: Nutrient/Hydration intake appropriate for improving, restoring or maintaining nutritional needs  Description  Monitor and assess patient's nutrition/hydration status for malnutrition (ex- brittle hair, bruises, dry skin, pale skin and conjunctiva, muscle wasting, smooth red tongue, and disorientation)  Collaborate with interdisciplinary team and initiate plan and interventions as ordered  Monitor patient's weight and dietary intake as ordered or per policy  Utilize nutrition screening tool and intervene per policy  Determine patient's food preferences and provide high-protein, high-caloric foods as appropriate  INTERVENTIONS:  - Monitor oral intake, urinary output, labs, and treatment plans  - Assess nutrition and hydration status and recommend course of action  - Evaluate amount of meals eaten  - Assist patient with eating if necessary   - Allow adequate time for meals  - Recommend/ encourage appropriate diets, oral nutritional supplements, and vitamin/mineral supplements  - Order, calculate, and assess calorie counts as needed  - Recommend, monitor, and adjust tube feedings and TPN/PPN based on assessed needs  - Assess need for intravenous fluids  - Provide specific nutrition/hydration education as appropriate  - Include patient/family/caregiver in decisions related to nutrition  Outcome: Not Progressing  Note:   Not meeting estimated needs while NPO   Advance diet as medically able, if unable to advance, consider need for nutrition support

## 2019-03-10 LAB
ANION GAP SERPL CALCULATED.3IONS-SCNC: 13 MMOL/L (ref 4–13)
BASOPHILS # BLD AUTO: 0.03 THOUSANDS/ΜL (ref 0–0.1)
BASOPHILS NFR BLD AUTO: 0 % (ref 0–1)
BUN SERPL-MCNC: 19 MG/DL (ref 5–25)
CA-I BLD-SCNC: 1.07 MMOL/L (ref 1.12–1.32)
CALCIUM SERPL-MCNC: 8 MG/DL (ref 8.3–10.1)
CHLORIDE SERPL-SCNC: 103 MMOL/L (ref 100–108)
CO2 SERPL-SCNC: 21 MMOL/L (ref 21–32)
CREAT SERPL-MCNC: 0.98 MG/DL (ref 0.6–1.3)
EOSINOPHIL # BLD AUTO: 0.01 THOUSAND/ΜL (ref 0–0.61)
EOSINOPHIL NFR BLD AUTO: 0 % (ref 0–6)
ERYTHROCYTE [DISTWIDTH] IN BLOOD BY AUTOMATED COUNT: 12.7 % (ref 11.6–15.1)
GFR SERPL CREATININE-BSD FRML MDRD: 91 ML/MIN/1.73SQ M
GLUCOSE SERPL-MCNC: 88 MG/DL (ref 65–140)
HCT VFR BLD AUTO: 37.9 % (ref 36.5–49.3)
HGB BLD-MCNC: 12.7 G/DL (ref 12–17)
IMM GRANULOCYTES # BLD AUTO: 0.05 THOUSAND/UL (ref 0–0.2)
IMM GRANULOCYTES NFR BLD AUTO: 0 % (ref 0–2)
LYMPHOCYTES # BLD AUTO: 2.16 THOUSANDS/ΜL (ref 0.6–4.47)
LYMPHOCYTES NFR BLD AUTO: 19 % (ref 14–44)
MAGNESIUM SERPL-MCNC: 1.9 MG/DL (ref 1.6–2.6)
MCH RBC QN AUTO: 29.5 PG (ref 26.8–34.3)
MCHC RBC AUTO-ENTMCNC: 33.5 G/DL (ref 31.4–37.4)
MCV RBC AUTO: 88 FL (ref 82–98)
MONOCYTES # BLD AUTO: 1.17 THOUSAND/ΜL (ref 0.17–1.22)
MONOCYTES NFR BLD AUTO: 10 % (ref 4–12)
NEUTROPHILS # BLD AUTO: 7.94 THOUSANDS/ΜL (ref 1.85–7.62)
NEUTS SEG NFR BLD AUTO: 71 % (ref 43–75)
NRBC BLD AUTO-RTO: 0 /100 WBCS
PLATELET # BLD AUTO: 267 THOUSANDS/UL (ref 149–390)
PMV BLD AUTO: 9.4 FL (ref 8.9–12.7)
POTASSIUM SERPL-SCNC: 4 MMOL/L (ref 3.5–5.3)
RBC # BLD AUTO: 4.3 MILLION/UL (ref 3.88–5.62)
SODIUM SERPL-SCNC: 137 MMOL/L (ref 136–145)
WBC # BLD AUTO: 11.36 THOUSAND/UL (ref 4.31–10.16)

## 2019-03-10 PROCEDURE — 80048 BASIC METABOLIC PNL TOTAL CA: CPT | Performed by: SURGERY

## 2019-03-10 PROCEDURE — 83735 ASSAY OF MAGNESIUM: CPT | Performed by: SURGERY

## 2019-03-10 PROCEDURE — 99232 SBSQ HOSP IP/OBS MODERATE 35: CPT | Performed by: SURGERY

## 2019-03-10 PROCEDURE — 85025 COMPLETE CBC W/AUTO DIFF WBC: CPT | Performed by: SURGERY

## 2019-03-10 PROCEDURE — 82330 ASSAY OF CALCIUM: CPT | Performed by: SURGERY

## 2019-03-10 RX ORDER — DEXTROSE AND SODIUM CHLORIDE 5; .45 G/100ML; G/100ML
50 INJECTION, SOLUTION INTRAVENOUS CONTINUOUS
Status: DISCONTINUED | OUTPATIENT
Start: 2019-03-10 | End: 2019-03-11

## 2019-03-10 RX ADMIN — PIPERACILLIN SODIUM,TAZOBACTAM SODIUM 4.5 G: 4; .5 INJECTION, POWDER, FOR SOLUTION INTRAVENOUS at 17:00

## 2019-03-10 RX ADMIN — PIPERACILLIN SODIUM,TAZOBACTAM SODIUM 4.5 G: 4; .5 INJECTION, POWDER, FOR SOLUTION INTRAVENOUS at 11:07

## 2019-03-10 RX ADMIN — DEXTROSE AND SODIUM CHLORIDE 50 ML/HR: 5; .45 INJECTION, SOLUTION INTRAVENOUS at 16:50

## 2019-03-10 RX ADMIN — ENOXAPARIN SODIUM 40 MG: 40 INJECTION SUBCUTANEOUS at 11:09

## 2019-03-10 RX ADMIN — PIPERACILLIN SODIUM,TAZOBACTAM SODIUM 4.5 G: 4; .5 INJECTION, POWDER, FOR SOLUTION INTRAVENOUS at 22:00

## 2019-03-10 RX ADMIN — CALCIUM GLUCONATE 2 G: 98 INJECTION, SOLUTION INTRAVENOUS at 15:27

## 2019-03-10 RX ADMIN — PIPERACILLIN SODIUM,TAZOBACTAM SODIUM 4.5 G: 4; .5 INJECTION, POWDER, FOR SOLUTION INTRAVENOUS at 03:01

## 2019-03-10 RX ADMIN — NICOTINE 1 PATCH: 21 PATCH TRANSDERMAL at 11:12

## 2019-03-10 NOTE — PROGRESS NOTES
Progress Note - Kathy Skinner Sr 54 y o  male MRN: [id number]    Unit/Bed#: -01 Encounter: [id number]      Assessment:  Perforated sigmoid diverticulitis  Decreased leukocytosis    Plan:  Start CLD  IV fluids, until good PO then heplock  IV antibiotics  Monitor vitals closely  Patient continues to improve, but I did caution him any worsening of symptoms or labs may recur still require surgical intervention      Subjective:   Patient feels better today  No significant pain at rest  Mild on palpation  Objective:     Vitals: Blood pressure 128/72, pulse 91, temperature 97 7 °F (36 5 °C), temperature source Oral, resp  rate 18, height 5' 10" (1 778 m), weight 95 2 kg (209 lb 14 1 oz), SpO2 96 %  ,Body mass index is 30 11 kg/m²  Intake/Output Summary (Last 24 hours) at 3/10/2019 1215  Last data filed at 3/10/2019 0700  Gross per 24 hour   Intake 2516 67 ml   Output 1300 ml   Net 1216 67 ml       Physical Exam:  Awake alert oriented no acute distress  Abdomen is soft nondistended decreased tenderness to palpation compared to the exam yesterday  Pain most prominent the lower abdomen       Invasive Devices     Peripheral Intravenous Line            Peripheral IV 03/08/19 Right Antecubital 1 day                Lab, Imaging and other studies: Leukocytosis improved at 11 from 13

## 2019-03-10 NOTE — PLAN OF CARE
Problem: Nutrition/Hydration-ADULT  Goal: Nutrient/Hydration intake appropriate for improving, restoring or maintaining nutritional needs  Description  Monitor and assess patient's nutrition/hydration status for malnutrition (ex- brittle hair, bruises, dry skin, pale skin and conjunctiva, muscle wasting, smooth red tongue, and disorientation)  Collaborate with interdisciplinary team and initiate plan and interventions as ordered  Monitor patient's weight and dietary intake as ordered or per policy  Utilize nutrition screening tool and intervene per policy  Determine patient's food preferences and provide high-protein, high-caloric foods as appropriate  INTERVENTIONS:  - Monitor oral intake, urinary output, labs, and treatment plans  - Assess nutrition and hydration status and recommend course of action  - Evaluate amount of meals eaten  - Assist patient with eating if necessary   - Allow adequate time for meals  - Recommend/ encourage appropriate diets, oral nutritional supplements, and vitamin/mineral supplements  - Order, calculate, and assess calorie counts as needed  - Recommend, monitor, and adjust tube feedings and TPN/PPN based on assessed needs  - Assess need for intravenous fluids  - Provide specific nutrition/hydration education as appropriate  - Include patient/family/caregiver in decisions related to nutrition  Outcome: Progressing     Problem: Potential for Falls  Goal: Patient will remain free of falls  Description  INTERVENTIONS:  - Assess patient frequently for physical needs  -  Identify cognitive and physical deficits and behaviors that affect risk of falls    -  [place] fall precautions as indicated by assessment   - Educate patient/family on patient safety including physical limitations  - Instruct patient to call for assistance with activity based on assessment  - Modify environment to reduce risk of injury  - Consider OT/PT consult to assist with strengthening/mobility  Outcome: Progressing

## 2019-03-11 ENCOUNTER — TELEPHONE (OUTPATIENT)
Dept: GASTROENTEROLOGY | Facility: CLINIC | Age: 49
End: 2019-03-11

## 2019-03-11 LAB
ANION GAP SERPL CALCULATED.3IONS-SCNC: 10 MMOL/L (ref 4–13)
ANION GAP SERPL CALCULATED.3IONS-SCNC: 9 MMOL/L (ref 4–13)
BASOPHILS # BLD AUTO: 0.03 THOUSANDS/ΜL (ref 0–0.1)
BASOPHILS # BLD MANUAL: 0 THOUSAND/UL (ref 0–0.1)
BASOPHILS NFR BLD AUTO: 0 % (ref 0–1)
BASOPHILS NFR MAR MANUAL: 0 % (ref 0–1)
BUN SERPL-MCNC: 13 MG/DL (ref 5–25)
BUN SERPL-MCNC: 13 MG/DL (ref 5–25)
C TRACH DNA SPEC QL NAA+PROBE: NEGATIVE
CA-I BLD-SCNC: 1.1 MMOL/L (ref 1.12–1.32)
CALCIUM SERPL-MCNC: 8.3 MG/DL (ref 8.3–10.1)
CALCIUM SERPL-MCNC: 8.5 MG/DL (ref 8.3–10.1)
CHLORIDE SERPL-SCNC: 101 MMOL/L (ref 100–108)
CHLORIDE SERPL-SCNC: 103 MMOL/L (ref 100–108)
CO2 SERPL-SCNC: 25 MMOL/L (ref 21–32)
CO2 SERPL-SCNC: 27 MMOL/L (ref 21–32)
CREAT SERPL-MCNC: 0.98 MG/DL (ref 0.6–1.3)
CREAT SERPL-MCNC: 0.99 MG/DL (ref 0.6–1.3)
EOSINOPHIL # BLD AUTO: 0 THOUSAND/ΜL (ref 0–0.61)
EOSINOPHIL # BLD MANUAL: 0.15 THOUSAND/UL (ref 0–0.4)
EOSINOPHIL NFR BLD AUTO: 0 % (ref 0–6)
EOSINOPHIL NFR BLD MANUAL: 2 % (ref 0–6)
ERYTHROCYTE [DISTWIDTH] IN BLOOD BY AUTOMATED COUNT: 12.4 % (ref 11.6–15.1)
ERYTHROCYTE [DISTWIDTH] IN BLOOD BY AUTOMATED COUNT: 12.4 % (ref 11.6–15.1)
GFR SERPL CREATININE-BSD FRML MDRD: 90 ML/MIN/1.73SQ M
GFR SERPL CREATININE-BSD FRML MDRD: 91 ML/MIN/1.73SQ M
GLUCOSE SERPL-MCNC: 103 MG/DL (ref 65–140)
GLUCOSE SERPL-MCNC: 82 MG/DL (ref 65–140)
HCT VFR BLD AUTO: 36 % (ref 36.5–49.3)
HCT VFR BLD AUTO: 37.4 % (ref 36.5–49.3)
HGB BLD-MCNC: 12.2 G/DL (ref 12–17)
HGB BLD-MCNC: 12.6 G/DL (ref 12–17)
IMM GRANULOCYTES # BLD AUTO: 0.03 THOUSAND/UL (ref 0–0.2)
IMM GRANULOCYTES NFR BLD AUTO: 0 % (ref 0–2)
LYMPHOCYTES # BLD AUTO: 1.59 THOUSAND/UL (ref 0.6–4.47)
LYMPHOCYTES # BLD AUTO: 2.21 THOUSANDS/ΜL (ref 0.6–4.47)
LYMPHOCYTES # BLD AUTO: 21 % (ref 14–44)
LYMPHOCYTES NFR BLD AUTO: 28 % (ref 14–44)
MAGNESIUM SERPL-MCNC: 2 MG/DL (ref 1.6–2.6)
MCH RBC QN AUTO: 29.6 PG (ref 26.8–34.3)
MCH RBC QN AUTO: 29.7 PG (ref 26.8–34.3)
MCHC RBC AUTO-ENTMCNC: 33.7 G/DL (ref 31.4–37.4)
MCHC RBC AUTO-ENTMCNC: 33.9 G/DL (ref 31.4–37.4)
MCV RBC AUTO: 88 FL (ref 82–98)
MCV RBC AUTO: 88 FL (ref 82–98)
MONOCYTES # BLD AUTO: 0.53 THOUSAND/UL (ref 0–1.22)
MONOCYTES # BLD AUTO: 1 THOUSAND/ΜL (ref 0.17–1.22)
MONOCYTES NFR BLD AUTO: 13 % (ref 4–12)
MONOCYTES NFR BLD: 7 % (ref 4–12)
N GONORRHOEA DNA SPEC QL NAA+PROBE: NEGATIVE
NEUTROPHILS # BLD AUTO: 4.76 THOUSANDS/ΜL (ref 1.85–7.62)
NEUTROPHILS # BLD MANUAL: 5.08 THOUSAND/UL (ref 1.85–7.62)
NEUTS SEG NFR BLD AUTO: 59 % (ref 43–75)
NEUTS SEG NFR BLD AUTO: 67 % (ref 43–75)
NRBC BLD AUTO-RTO: 0 /100 WBCS
NRBC BLD AUTO-RTO: 0 /100 WBCS
PLATELET # BLD AUTO: 285 THOUSANDS/UL (ref 149–390)
PLATELET # BLD AUTO: 315 THOUSANDS/UL (ref 149–390)
PLATELET BLD QL SMEAR: ADEQUATE
PMV BLD AUTO: 9.4 FL (ref 8.9–12.7)
PMV BLD AUTO: 9.5 FL (ref 8.9–12.7)
POTASSIUM SERPL-SCNC: 3.4 MMOL/L (ref 3.5–5.3)
POTASSIUM SERPL-SCNC: 3.7 MMOL/L (ref 3.5–5.3)
RBC # BLD AUTO: 4.11 MILLION/UL (ref 3.88–5.62)
RBC # BLD AUTO: 4.26 MILLION/UL (ref 3.88–5.62)
SODIUM SERPL-SCNC: 137 MMOL/L (ref 136–145)
SODIUM SERPL-SCNC: 138 MMOL/L (ref 136–145)
TOTAL CELLS COUNTED SPEC: 100
VARIANT LYMPHS # BLD AUTO: 3 %
WBC # BLD AUTO: 7.58 THOUSAND/UL (ref 4.31–10.16)
WBC # BLD AUTO: 8.03 THOUSAND/UL (ref 4.31–10.16)

## 2019-03-11 PROCEDURE — 85007 BL SMEAR W/DIFF WBC COUNT: CPT | Performed by: SURGERY

## 2019-03-11 PROCEDURE — 83735 ASSAY OF MAGNESIUM: CPT | Performed by: SURGERY

## 2019-03-11 PROCEDURE — 99233 SBSQ HOSP IP/OBS HIGH 50: CPT | Performed by: SURGERY

## 2019-03-11 PROCEDURE — 82330 ASSAY OF CALCIUM: CPT | Performed by: SURGERY

## 2019-03-11 PROCEDURE — 80048 BASIC METABOLIC PNL TOTAL CA: CPT | Performed by: SURGERY

## 2019-03-11 PROCEDURE — 80048 BASIC METABOLIC PNL TOTAL CA: CPT | Performed by: PHYSICIAN ASSISTANT

## 2019-03-11 PROCEDURE — 85027 COMPLETE CBC AUTOMATED: CPT | Performed by: SURGERY

## 2019-03-11 PROCEDURE — 85025 COMPLETE CBC W/AUTO DIFF WBC: CPT | Performed by: PHYSICIAN ASSISTANT

## 2019-03-11 RX ADMIN — PIPERACILLIN SODIUM,TAZOBACTAM SODIUM 4.5 G: 4; .5 INJECTION, POWDER, FOR SOLUTION INTRAVENOUS at 19:55

## 2019-03-11 RX ADMIN — PIPERACILLIN SODIUM,TAZOBACTAM SODIUM 4.5 G: 4; .5 INJECTION, POWDER, FOR SOLUTION INTRAVENOUS at 02:27

## 2019-03-11 RX ADMIN — ENOXAPARIN SODIUM 40 MG: 40 INJECTION SUBCUTANEOUS at 09:32

## 2019-03-11 RX ADMIN — NICOTINE 1 PATCH: 21 PATCH TRANSDERMAL at 09:35

## 2019-03-11 RX ADMIN — PIPERACILLIN SODIUM,TAZOBACTAM SODIUM 4.5 G: 4; .5 INJECTION, POWDER, FOR SOLUTION INTRAVENOUS at 07:51

## 2019-03-11 RX ADMIN — PIPERACILLIN SODIUM,TAZOBACTAM SODIUM 4.5 G: 4; .5 INJECTION, POWDER, FOR SOLUTION INTRAVENOUS at 13:24

## 2019-03-11 NOTE — PROGRESS NOTES
Progress Note -Surgery BONNY Deshpande Sr 54 y o  male MRN: [id number]  Unit/Bed#: -01 Encounter: [id number]      Assessment   Perforated Sigmoid diverticulitis  Leukocytosis - resolved, afebrile vital signs stable  Plan   - advance to full liquid diet, possible advance to surgical soft later today  - continue with IV antibiotics  - DC IV fluids  - encourage ambulation  _____________________________________________________________________  Subjective:   He is tolerating a clear liquid diet  Pain as and proved more since yesterday  Occasional discomfort suprapubic but relieved with urination  Occasional left lower quadrant pain with movement  Overall improved  No nausea or vomiting  Denies fever or chills  Denies hematuria, hematochezia, or melena  Had colonoscopy scheduled for this week prior to episode of diverticulitis  Discussed that this will need to be postponed at least 4-6 weeks, but the patient should proceed with colonoscopy  Objective:    Vitals:  /65 (BP Location: Left arm)   Pulse 77   Temp 97 9 °F (36 6 °C) (Oral)   Resp 18   Ht 5' 10" (1 778 m)   Wt 95 2 kg (209 lb 14 1 oz)   SpO2 97%   BMI 30 11 kg/m²     I/Os:  I/O last 3 completed shifts:   In: 2175 8 [I V :2175 8]  Out: 2450 [Urine:2450]    I/O this shift:  In: -   Out: 300 [Urine:300]    Invasive Devices     Peripheral Intravenous Line            Peripheral IV 03/08/19 Right Antecubital 2 days                Medications:  Current Facility-Administered Medications   Medication Dose Route Frequency    acetaminophen (TYLENOL) tablet 650 mg  650 mg Oral Q6H PRN    dextrose 5 % and sodium chloride 0 45 % infusion  50 mL/hr Intravenous Continuous    enoxaparin (LOVENOX) subcutaneous injection 40 mg  40 mg Subcutaneous Daily    morphine injection 2 mg  2 mg Intravenous Q2H PRN    naloxone (NARCAN) injection 0 04 mg  0 04 mg Intravenous Q1MIN PRN    nicotine (NICODERM CQ) 21 mg/24 hr TD 24 hr patch 1 patch  1 patch Transdermal Daily    ondansetron (ZOFRAN) injection 4 mg  4 mg Intravenous Q6H PRN    piperacillin-tazobactam (ZOSYN) 4 5 g in sodium chloride 0 9 % 100 mL IVPB  4 5 g Intravenous Q6H       Lab Results and Cultures:   CBC with diff:   Lab Results   Component Value Date    WBC 7 58 03/11/2019    HGB 12 2 03/11/2019    HCT 36 0 (L) 03/11/2019    MCV 88 03/11/2019     03/11/2019    MCH 29 7 03/11/2019    MCHC 33 9 03/11/2019    RDW 12 4 03/11/2019    MPV 9 4 03/11/2019    NRBC 0 03/11/2019     BMP/CMP:  Lab Results   Component Value Date    K 3 7 03/11/2019     03/11/2019    CO2 25 03/11/2019    BUN 13 03/11/2019    CREATININE 0 99 03/11/2019    CALCIUM 8 3 03/11/2019    AST 15 03/08/2019    ALT 37 03/08/2019    ALKPHOS 92 03/08/2019    EGFR 90 03/11/2019   Coags:   Lab Results   Component Value Date    PTT 33 03/08/2019    INR 1 24 (H) 03/08/2019     Urinalysis:   Lab Results   Component Value Date    COLORU Yellow 03/08/2019    CLARITYU Clear 03/08/2019    SPECGRAV 1 020 03/08/2019    PHUR 6 5 03/08/2019    LEUKOCYTESUR Negative 03/08/2019    NITRITE Positive (A) 03/08/2019    GLUCOSEU Negative 03/08/2019    KETONESU 15 (1+) (A) 03/08/2019    BILIRUBINUR Negative 03/08/2019    BLOODU Trace-Intact (A) 03/08/2019       Physical Exam:  General Appearance:    Alert and orientated x 3, cooperative, no distress, appears stated age   Lungs:     Clear to auscultation bilaterally, respirations unlabored, no wheezes    Heart:    Regular rate and rhythm, S1 and S2 normal, no murmur   Abdomen:    Normoactive BS, soft, very mild tenderness to palpation of left lower quadrant, no guarding, non rigid, no masses, no palpated organomegaly   Extremities:  Extremities normal, no calf tenderness, no cyanosis or edema   Pulses:   2+ and symmetric all extremities   Skin:   Skin color, texture, turgor normal, no rashes   Neurologic:   CNII-XII intact, normal strength, affect appropriate       Imaging:  Ct Abdomen Pelvis With Contrast    Result Date: 3/8/2019  Impression: 1  Moderate amount of free intraperitoneal air  There is sigmoid bowel wall thickening in an area of multiple diverticula with adjacent pericolonic fat stranding (coronal images 68 through 76)  Findings are suspicious for perforated diverticulitis  2   Prominent fluid-filled loops of small bowel compatible with ileus    I personally discussed this study with Dr Albina Martinez on 3/8/2019 at 2:17 PM  Workstation performed: [id number]       VTE Pharmacologic Prophylaxis: Enoxaparin (Lovenox)  VTE Mechanical Prophylaxis: sequential compression device    Jessica Palacios PA-C   3/11/2019

## 2019-03-11 NOTE — PLAN OF CARE
Problem: Nutrition/Hydration-ADULT  Goal: Nutrient/Hydration intake appropriate for improving, restoring or maintaining nutritional needs  Description  Monitor and assess patient's nutrition/hydration status for malnutrition (ex- brittle hair, bruises, dry skin, pale skin and conjunctiva, muscle wasting, smooth red tongue, and disorientation)  Collaborate with interdisciplinary team and initiate plan and interventions as ordered  Monitor patient's weight and dietary intake as ordered or per policy  Utilize nutrition screening tool and intervene per policy  Determine patient's food preferences and provide high-protein, high-caloric foods as appropriate  INTERVENTIONS:  - Monitor oral intake, urinary output, labs, and treatment plans  - Assess nutrition and hydration status and recommend course of action  - Evaluate amount of meals eaten  - Assist patient with eating if necessary   - Allow adequate time for meals  - Recommend/ encourage appropriate diets, oral nutritional supplements, and vitamin/mineral supplements  - Order, calculate, and assess calorie counts as needed  - Recommend, monitor, and adjust tube feedings and TPN/PPN based on assessed needs  - Assess need for intravenous fluids  - Provide specific nutrition/hydration education as appropriate  - Include patient/family/caregiver in decisions related to nutrition  3/11/2019 0023 by Kaylah Tapia RN  Outcome: Progressing  3/11/2019 0023 by Kaylah Tapia RN  Outcome: Progressing     Problem: Potential for Falls  Goal: Patient will remain free of falls  Description  INTERVENTIONS:  - Assess patient frequently for physical needs  -  Identify cognitive and physical deficits and behaviors that affect risk of falls    -  [place] fall precautions as indicated by assessment   - Educate patient/family on patient safety including physical limitations  - Instruct patient to call for assistance with activity based on assessment  - Modify environment to reduce risk of injury  - Consider OT/PT consult to assist with strengthening/mobility  3/11/2019 0023 by Daisy Murphy RN  Outcome: Progressing  3/11/2019 0023 by Daisy Murphy, RN  Outcome: Progressing

## 2019-03-12 VITALS
TEMPERATURE: 98.06 F | BODY MASS INDEX: 30.05 KG/M2 | SYSTOLIC BLOOD PRESSURE: 115 MMHG | OXYGEN SATURATION: 96 % | DIASTOLIC BLOOD PRESSURE: 67 MMHG | WEIGHT: 209.88 LBS | RESPIRATION RATE: 18 BRPM | HEART RATE: 79 BPM | HEIGHT: 70 IN

## 2019-03-12 PROCEDURE — 99238 HOSP IP/OBS DSCHRG MGMT 30/<: CPT | Performed by: PHYSICIAN ASSISTANT

## 2019-03-12 RX ORDER — AMOXICILLIN AND CLAVULANATE POTASSIUM 875; 125 MG/1; MG/1
1 TABLET, FILM COATED ORAL EVERY 12 HOURS SCHEDULED
Qty: 20 TABLET | Refills: 0 | Status: SHIPPED | OUTPATIENT
Start: 2019-03-12 | End: 2019-03-22

## 2019-03-12 RX ADMIN — PIPERACILLIN SODIUM,TAZOBACTAM SODIUM 4.5 G: 4; .5 INJECTION, POWDER, FOR SOLUTION INTRAVENOUS at 09:32

## 2019-03-12 RX ADMIN — ENOXAPARIN SODIUM 40 MG: 40 INJECTION SUBCUTANEOUS at 08:40

## 2019-03-12 RX ADMIN — NICOTINE 1 PATCH: 21 PATCH TRANSDERMAL at 08:41

## 2019-03-12 RX ADMIN — PIPERACILLIN SODIUM,TAZOBACTAM SODIUM 4.5 G: 4; .5 INJECTION, POWDER, FOR SOLUTION INTRAVENOUS at 01:51

## 2019-03-12 NOTE — UTILIZATION REVIEW
Mahi Fleming RN   Registered Nurse   Utilization Review   Utilization Review   Signed   Date of Service:  3/9/2019  9:18 AM               Signed                 Show:Clear all  [x]Manual[x]Template[x]Copied    Added by:  [x]Chata Molina RN      []Noemy for details      Initial Clinical Review     Admission: Date/Time/Statement: 3/8/19 @ 1431         Orders Placed This Encounter   Procedures    Inpatient Admission       Standing Status:   Standing       Number of Occurrences:   1       Order Specific Question:   Admitting Physician       Answer:   Valencia Wilson [[id number]]       Order Specific Question:   Level of Care       Answer:   Med Surg [16]       Order Specific Question:   Estimated length of stay       Answer:   More than 2 Midnights       Order Specific Question:   Certification       Answer:   I certify that inpatient services are medically necessary for this patient for a duration of greater than two midnights  See H&P and MD Progress Notes for additional information about the patient's course of treatment       ED: Date/Time/Mode of Arrival:             ED Arrival Information      Expected Arrival Acuity Means of Arrival Escorted By Service Admission Type     - 3/8/2019 11:06 Urgent Walk-In Spouse Surgery-General Urgent     Arrival Complaint     ABDOMINAL PAIN          Chief Complaint:        Chief Complaint   Patient presents with    Abdominal Pain       Pt states pain for about a week, fever started last night  Pt states nausea, no vomiting  Pt states fever high of 102 last night       Assessment/Plan: 53 yo male admitted to from  Home w/ abd pain for the last week   + fevers   CT + perforation of sigmoid colon d/t diverticulitis will use conservative measures, repeat labs in am and monitor    May need Castellanos's procedure if not improved in am      PE : tender superior abd , guarding      ED Vital Signs:            ED Triage Vitals   Temperature Pulse Respirations Blood Pressure SpO2   03/08/19 1156 03/08/19 1156 03/08/19 1156 03/08/19 1156 03/08/19 1420   99 7 °F (37 6 °C) 99 17 137/65 99 %       Temp Source Heart Rate Source Patient Position - Orthostatic VS BP Location FiO2 (%)   03/08/19 1156 03/08/19 1156 03/08/19 1156 03/08/19 1156 --   Oral Monitor Sitting Right arm         Pain Score           03/08/19 1156           6                Wt Readings from Last 1 Encounters:   03/08/19 95 3 kg (210 lb)      Vital Signs (abnormal):   1706 99 6 °F (37 6 °C) 106Abnormal  17 125/65 85 95 % None (Room air) Lying   03/08/19 1700 100 9 °F (38 3 °C)Abnormal           03/08/19 1420 100 6 °F (38 1 °C)Abnormal  88 17                   Pertinent Labs/Diagnostic Test Results: na   134, cl  97, total bili  1 40, lipase  54 pt inr  15 4  1 24 wbc  17 89  CT abd- 1   Moderate amount of free intraperitoneal air   There is sigmoid bowel wall thickening in an area of multiple diverticula with adjacent pericolonic fat stranding (coronal images 68 through 76)   Findings are suspicious for perforated diverticulitis  2   Prominent fluid-filled loops of small bowel compatible with ileus    ED Treatment:               Medication Administration from 03/08/2019 1106 to 03/08/2019 1750        Date/Time Order Dose Route Action Action by Comments       03/08/2019 1436 sodium chloride 0 9 % bolus 1,000 mL 0 mL Intravenous Stopped Lavern Wright RN         03/08/2019 1238 sodium chloride 0 9 % bolus 1,000 mL 1,000 mL Intravenous New Bag Lavern Wright RN         03/08/2019 1331 iohexol (OMNIPAQUE) 350 MG/ML injection (MULTI-DOSE) 100 mL 100 mL Intravenous Given Tesha Hendrix         03/08/2019 1555 piperacillin-tazobactam (ZOSYN) 3 375 g in sodium chloride 0 9 % 50 mL IVPB 3 375 g Intravenous Jim[address]         03/08/2019 1525 metroNIDAZOLE (FLAGYL) IVPB (premix) 500 mg 0 mg Intravenous Stopped SHARON Teran         03/08/2019 1435 metroNIDAZOLE (FLAGYL) IVPB (premix) 500 mg 500 mg Intravenous New Bag Thiago Sheppard RN         03/08/2019 1612 sodium chloride 0 9 % infusion 0 mL/hr Intravenous Stopped Madelyn Rosenthal RN         03/08/2019 1555 sodium chloride 0 9 % infusion 125 mL/hr Intravenous [address] [facility]         03/08/2019 1612 sodium chloride 0 9 % infusion 125 mL/hr [facility]ous [facility] [address] [facility]         03/08/2019 1613 nicotine (NICODERM CQ) 21 mg/24 hr TD 24 hr patch 1 patch 1 patch Transdermal Medication Applied Madelyn Rosenthal RN         03/08/2019 1613 enoxaparin (LOVENOX) subcutaneous injection 40 mg 40 mg Subcutaneous Given Madelyn Rosenthal RN         03/08/2019 1655 morphine (PF) 4 mg/mL injection 3 mg 3 mg Intravenous Given Madelyn Rosenthal RN            Past Medical/Surgical History:         Active Ambulatory Problems     Diagnosis Date Noted    Melena 03/05/2019    Nausea 03/05/2019    LLQ pain 03/05/2019    Rectal bleed 03/05/2019    Loose stools 03/05/2019           Past Medical History:   Diagnosis Date    Asthma        Admitting Diagnosis: Peritoneal free air [K66 8]  Abdominal pain [R10 9]  Perforated diverticulum of large intestine [K57 20]  Age/Sex: 54 y o  male  Admission Orders:  Scheduled Meds:   Current Facility-Administered Medications:  acetaminophen 650 mg Oral Q6H PRN       enoxaparin 40 mg Subcutaneous Daily       morphine injection 2 mg Intravenous Q2H PRN x2     naloxone 0 04 mg Intravenous Q1MIN PRN       nicotine 1 patch Transdermal Daily       ondansetron 4 mg Intravenous Q6H PRN       piperacillin-tazobactam 4 5 g Intravenous Q6H   Last Rate: 4 5 g (03/09/19 0830)   sodium chloride 125 mL/hr Intravenous Continuous   Last Rate: 125 mL/hr (03/09/19 0837)      IS   NPO   SCD  I&O   3/9 lactic acid , mg , [place] , bmp, cbc

## 2019-03-12 NOTE — DISCHARGE SUMMARY
Discharge Summary - Dionne Richards Sr 54 y o  male MRN: [id number]    Unit/Bed#: -01 Encounter: [id number]    Admission Date:   Admission Orders (From admission, onward)    Ordered        03/08/19 1431  Inpatient Admission  Once     Order ID Start Status   [id number] 03/08/19 1431 Completed                Admitting Diagnosis: Peritoneal free air [K66 8]  Abdominal pain [R10 9]  Perforated diverticulum of large intestine [K57 20]    HPI: Colleen Harmon Sr is a 55 yo male who originally presented to the hospital with lower abdominal pain  At time of admission, he stated that the pain had been ongoing for approximately 1 week before he developed associated fevers  He had originally been planned for a colonoscopy with GI but given fevers, was instructed to go to the ED for evaluation  CT of his abdomen and pelvis showed a moderate amount of free intraperitoneal air with sigmoid bowel wall thickening the area of multiple diverticula with adjacent pericolonic fat stranding suspicious for perforated diverticulitis  Given findings, the patient was admitted to the hospital for management  Procedures Performed: No orders of the defined types were placed in this encounter  Summary of Hospital Course: The patient was admitted to the hospital, made NPO for bowel rest, and started on IV zosyn for conservative management and close observation  HOD#2 the patient reported improvement in abdominal pain on bowel rest and antibiotics and conservative measures were continued  Diet was advanced throughout the hospital stay as pain improved and patient bowel function returned  Labs were monitored and leukocytosis resolved with conservative treatment  On HOD#4, patient noted almost complete resolution of abdominal pain  He was able to tolerate a solid diet without any exacerbation of abdominal pain, nausea, or vomiting  He was passing flatus and having bowel movements  Leukocytosis resolved and patient was afebrile   Given findings, patient was deemed appropriate for discharge home with instructions to follow up as an outpatient with GI and surgery  He was given a 10 day prescription for Augmentin to complete a total two week course of antibiotics and was told to maintain a low fiber/low residue diet  All questions and concerns were answered  Significant Findings, Care, Treatment and Services Provided: See above    Ct Abdomen Pelvis With Contrast    Result Date: 3/8/2019  Impression: 1  Moderate amount of free intraperitoneal air  There is sigmoid bowel wall thickening in an area of multiple diverticula with adjacent pericolonic fat stranding (coronal images 68 through 76)  Findings are suspicious for perforated diverticulitis  2   Prominent fluid-filled loops of small bowel compatible with ileus  I personally discussed this study with Dr May Brown on 3/8/2019 at 2:17 PM  Workstation performed: [id number]       Complications: None    Discharge Diagnosis: Perforated sigmoid diverticulitis    Resolved Problems  Date Reviewed: 3/8/2019    None          Condition at Discharge: good         Discharge instructions/Information to patient and family:   See after visit summary for information provided to patient and family  Provisions for Follow-Up Care:  See after visit summary for information related to follow-up care and any pertinent home health orders  PCP: Poly Gómez DO    Disposition: See After Visit Summary for discharge disposition information  Planned Readmission: No    Discharge Statement   I spent 30 minutes discharging the patient  This time was spent on the day of discharge  I had direct contact with the patient on the day of discharge  Additional documentation is required if more than 30 minutes were spent on discharge  Discharge Medications:  See after visit summary for reconciled discharge medications provided to patient and family

## 2019-03-12 NOTE — DISCHARGE INSTRUCTIONS
Diverticulitis   WHAT YOU NEED TO KNOW:   Diverticulitis is a condition that causes small pockets along your intestine called diverticula to become inflamed or infected  This is caused by hard bowel movements, food, or bacteria that get stuck in the pockets  DISCHARGE INSTRUCTIONS:   Seek care immediately if:   · You have bowel movement or foul-smelling discharge leaking from your vagina or in your urine  · You have severe diarrhea  · You urinate less than usual or not at all  · You are not able to have a bowel movement  · You cannot stop vomiting  · You have severe abdominal pain, a fever, and your abdomen is larger than usual      · You have new or increased blood in your bowel movements  Contact your healthcare provider if:   · You have pain when you urinate  · Your symptoms get worse or do not go away  · You have questions or concerns about your condition or care  Medicines:   · Antibiotics  may be given to help prevent or treat a bacterial infection  Please make sure to finish the full course of antibiotics    · Prescription pain medicine  may be given  Ask your healthcare provider how to take this medicine safely  Some prescription pain medicines contain acetaminophen  Do not take other medicines that contain acetaminophen without talking to your healthcare provider  Too much acetaminophen may cause liver damage  Prescription pain medicine may cause constipation  Ask your healthcare provider how to prevent or treat constipation  · Take your medicine as directed  Contact your healthcare provider if you think your medicine is not helping or if you have side effects  Tell him or her if you are allergic to any medicine  Keep a list of the medicines, vitamins, and herbs you take  Include the amounts, and when and why you take them  Bring the list or the pill bottles to follow-up visits  Carry your medicine list with you in case of an emergency    Clear liquid diet:  A clear liquid diet includes any liquids that you can see through  Examples include water, ginger-tam, cranberry or apple juice, frozen fruit ice, or broth  Stay on a clear liquid diet until your symptoms are gone, or as directed  Follow up with your healthcare provider as directed: You may need to return for a colonoscopy  When your symptoms are gone, you may need a low-fat, high-fiber diet to help prevent diverticulitis from developing again  Your healthcare provider or dietitian can help you create meal plans  Write down your questions so you remember to ask them during your visits  Diverticulitis Diet   WHAT YOU NEED TO KNOW:   A diverticulitis diet includes foods that allow your intestines to rest while you have diverticulitis  Diverticulitis is a condition that causes small pockets along your intestine called diverticula to become inflamed or infected  This is caused by hard bowel movement, food, or bacteria that get stuck in the pockets  DISCHARGE INSTRUCTIONS:   Foods you can eat while you have diverticulitis:   · A clear liquid diet may be recommended for 2 to 3 days  A clear liquid diet is made up of clear liquids and foods that are liquid at room temperature  Your healthcare provider will tell you when you can start eating solid foods  Examples of clear liquids include the following:     ¨ Water and clear juices (such as apple, cranberry, or grape), strained citrus juices or fruit punch    ¨ Coffee or tea (without cream or milk)    ¨ Clear sports drinks or soft drinks, such as ginger ale, lemon-lime soda, or club soda (no cola or root beer)    ¨ Clear broth, bouillon, or consommé    ¨ Plain popsicles (no popsicles with pureed fruit or fiber)    ¨ Flavored gelatin without fruit    · A low-fiber diet may be recommended until your symptoms improve  Your healthcare provider will tell you when you can slowly add high-fiber foods back into your diet      ¨ Cream of wheat and finely ground grits    ¨ White bread, white pasta, and white rice    ¨ Canned and well-cooked fruit without skins or seeds, and juice without pulp    ¨ Canned and well-cooked vegetables without skins or seeds, and vegetable juice    ¨ Cow's milk, lactose-free milk, soy milk, and rice milk    ¨ Yogurt, cottage cheese, and sherbet    ¨ Eggs, poultry (such as chicken and turkey), fish, and tender, ground, well-cooked beef     ¨ Tofu and smooth nut butters, such as peanut butter    ¨ Broth and strained soups made of low-fiber foods  Foods you should avoid while you have diverticulitis:  Avoid foods that are high in fiber while you have symptoms of diverticulitis  Examples of high-fiber foods include the following:  · Whole grains and breads, and cereals made with whole grains    · Dried fruit, fresh fruit with skin, and fruit pulp    · Raw vegetables    · Cooked greens, such as spinach    · Tough meat and meat with gristle    · Legumes, such as muniz beans and lentils  Contact your healthcare provider if:   · Your symptoms do not get better, or they get worse  · You have questions about the foods you should eat  · You have questions or concerns about your condition or care

## 2019-03-12 NOTE — PROGRESS NOTES
Progress Note - General Surgery   Conrado Box  54 y o  male MRN: [id number]  Unit/Bed#: -01 Encounter: [id number]    Assessment:  1  Dina Zamora is a 54 y o  male with perforated sigmoid diverticulitis, improved    Plan:  - OK for discharge home today from surgical perspective  Pain much improved, leukocytosis resolved, and patient tolerating diet  Will prescribe oral antibiotics for a treatment course totally two weeks  - Will have patient follow up with surgeon as an outpatient  Educated patient to remain on low fiber, low residue diet until diet is advanced in office  Subjective/Objective     Subjective: In chair eating breakfast  States he feels well, abdominal pain is much improved  Tolerated solid diet yesterday evening without difficulty  Having bowel function  Denies any fevers or chills overnight  Objective:     Blood pressure 115/67, pulse 79, temperature 98 06 °F (36 7 °C), resp  rate 18, height 5' 10" (1 778 m), weight 95 2 kg (209 lb 14 1 oz), SpO2 96 %  ,Body mass index is 30 11 kg/m²        Intake/Output Summary (Last 24 hours) at 3/12/2019 0912  Last data filed at 3/11/2019 1300  Gross per 24 hour   Intake    Output 775 ml   Net -775 ml       Invasive Devices     Peripheral Intravenous Line            Peripheral IV 03/08/19 Right Antecubital 3 days                Physical Exam: /67   Pulse 79   Temp 98 06 °F (36 7 °C)   Resp 18   Ht 5' 10" (1 778 m)   Wt 95 2 kg (209 lb 14 1 oz)   SpO2 96%   BMI 30 11 kg/m²   General appearance: alert and oriented, in no acute distress  Lungs: clear to auscultation bilaterally  Heart: regular rate and rhythm, S1, S2 normal, no murmur, click, rub or gallop  Abdomen: soft, non-distended, active bowel sounds, mild discomfort to palpation in suprapubic region, non-tender elsewhere, no guarding or rebound tenderness  Extremities: extremities normal, warm and well-perfused; no cyanosis, clubbing, or edema    Lab, Imaging and other studies:I have personally reviewed pertinent lab results       VTE Pharmacologic Prophylaxis: Enoxaparin (Lovenox)  VTE Mechanical Prophylaxis: sequential compression device    Recent Results (from the past 36 hour(s))   CBC and differential    Collection Time: 03/11/19  4:50 AM   Result Value Ref Range    WBC 7 58 4 31 - 10 16 Thousand/uL    RBC 4 11 3 88 - 5 62 Million/uL    Hemoglobin 12 2 12 0 - 17 0 g/dL    Hematocrit 36 0 (L) 36 5 - 49 3 %    MCV 88 82 - 98 fL    MCH 29 7 26 8 - 34 3 pg    MCHC 33 9 31 4 - 37 4 g/dL    RDW 12 4 11 6 - 15 1 %    MPV 9 4 8 9 - 12 7 fL    Platelets 504 318 - 382 Thousands/uL    nRBC 0 /100 WBCs   Basic metabolic panel    Collection Time: 03/11/19  4:50 AM   Result Value Ref Range    Sodium 138 136 - 145 mmol/L    Potassium 3 7 3 5 - 5 3 mmol/L    Chloride 103 100 - 108 mmol/L    CO2 25 21 - 32 mmol/L    ANION GAP 10 4 - 13 mmol/L    BUN 13 5 - 25 mg/dL    Creatinine 0 99 0 60 - 1 30 mg/dL    Glucose 103 65 - 140 mg/dL    Calcium 8 3 8 3 - 10 1 mg/dL    eGFR 90 ml/min/1 73sq m   Calcium, ionized    Collection Time: 03/11/19  4:50 AM   Result Value Ref Range    Calcium, Ionized 1 10 (L) 1 12 - 1 32 mmol/L   Magnesium    Collection Time: 03/11/19  4:50 AM   Result Value Ref Range    Magnesium 2 0 1 6 - 2 6 mg/dL   Manual Differential(PHLEBS Do Not Order)    Collection Time: 03/11/19  4:50 AM   Result Value Ref Range    Segmented % 67 43 - 75 %    Lymphocytes % 21 14 - 44 %    Monocytes % 7 4 - 12 %    Eosinophils, % 2 0 - 6 %    Basophils % 0 0 - 1 %    Atypical Lymphocytes % 3 (H) <=0 %    Absolute Neutrophils 5 08 1 85 - 7 62 Thousand/uL    Lymphocytes Absolute 1 59 0 60 - 4 47 Thousand/uL    Monocytes Absolute 0 53 0 00 - 1 22 Thousand/uL    Eosinophils Absolute 0 15 0 00 - 0 40 Thousand/uL    Basophils Absolute 0 00 0 00 - 0 10 Thousand/uL    Total Counted 100     Platelet Estimate Adequate Adequate   CBC and differential    Collection Time: 03/11/19  4:26 PM   Result Value Ref Range    WBC 8 03 4 31 - 10 16 Thousand/uL    RBC 4 26 3 88 - 5 62 Million/uL    Hemoglobin 12 6 12 0 - 17 0 g/dL    Hematocrit 37 4 36 5 - 49 3 %    MCV 88 82 - 98 fL    MCH 29 6 26 8 - 34 3 pg    MCHC 33 7 31 4 - 37 4 g/dL    RDW 12 4 11 6 - 15 1 %    MPV 9 5 8 9 - 12 7 fL    Platelets 072 905 - 009 Thousands/uL    nRBC 0 /100 WBCs    Neutrophils Relative 59 43 - 75 %    Immat GRANS % 0 0 - 2 %    Lymphocytes Relative 28 14 - 44 %    Monocytes Relative 13 (H) 4 - 12 %    Eosinophils Relative 0 0 - 6 %    Basophils Relative 0 0 - 1 %    Neutrophils Absolute 4 76 1 85 - 7 62 Thousands/µL    Immature Grans Absolute 0 03 0 00 - 0 20 Thousand/uL    Lymphocytes Absolute 2 21 0 60 - 4 47 Thousands/µL    Monocytes Absolute 1 00 0 17 - 1 22 Thousand/µL    Eosinophils Absolute 0 00 0 00 - 0 61 Thousand/µL    Basophils Absolute 0 03 0 00 - 0 10 Thousands/µL   Basic metabolic panel    Collection Time: 03/11/19  4:26 PM   Result Value Ref Range    Sodium 137 136 - 145 mmol/L    Potassium 3 4 (L) 3 5 - 5 3 mmol/L    Chloride 101 100 - 108 mmol/L    CO2 27 21 - 32 mmol/L    ANION GAP 9 4 - 13 mmol/L    BUN 13 5 - 25 mg/dL    Creatinine 0 98 0 60 - 1 30 mg/dL    Glucose 82 65 - 140 mg/dL    Calcium 8 5 8 3 - 10 1 mg/dL    eGFR 91 ml/min/1 73sq m

## 2019-03-27 ENCOUNTER — OFFICE VISIT (OUTPATIENT)
Dept: SURGERY | Facility: CLINIC | Age: 49
End: 2019-03-27
Payer: COMMERCIAL

## 2019-03-27 VITALS
RESPIRATION RATE: 12 BRPM | HEIGHT: 70 IN | BODY MASS INDEX: 28.92 KG/M2 | SYSTOLIC BLOOD PRESSURE: 100 MMHG | TEMPERATURE: 98.8 F | WEIGHT: 202 LBS | DIASTOLIC BLOOD PRESSURE: 66 MMHG

## 2019-03-27 DIAGNOSIS — K57.20 PERFORATION OF SIGMOID COLON DUE TO DIVERTICULITIS: Primary | ICD-10-CM

## 2019-03-27 PROCEDURE — 99214 OFFICE O/P EST MOD 30 MIN: CPT | Performed by: SURGERY

## 2019-03-27 NOTE — PROGRESS NOTES
GENERAL SURGERY HISTORY AND PHYSICAL     Otilia Hernandez Sr 54 y o  male MRN: [id number]  Unit/Bed#:  Encounter: [id number]      Assessment/Plan   1  Perforation of sigmoid colon due to diverticulitis       Patient with episode of perforated sigmoid diverticulitis  Patient recovered with conservative measures IV antibiotics  Patient currently has no symptoms concerning for residual intra-abdominal abscess  Strongly encourage patient to follow up with GI and reschedule colonoscopy to rule out any possible malignant process that could have caused perforation  Given his improvement low likelihood put is important to assess the area  Given single episode of diverticulitis and no significant sequelae would not recommend elective sigmoid resection at this time  Discussed with patient possibility of recurrent episodes that may then require elective resection for definitive resolution  Also possibility of future episodes of diverticulitis that could require more emergent intervention  Patient may follow up at any time in the future if he has recurrent symptoms or other concerning findings on colonoscopy  Chief Complaint perforated sigmoid diverticulitis    HPI: Serafin Santiago is a 54y o  year old male who presents for outpatient follow-up after hospitalization for perforated sigmoid diverticulitis  Patient has been doing well  Is tolerating diet  Has occasional lower abdominal pain but denies any persistent intense pain that he had had before coming to the hospital previously  Denies fevers or chills  Has had some mild constipation but denies any significant pain or cara obstipation  Does not have significant amount of fruits vegetables in his diet  Has not yet reestablished with GI to plan for rescheduling of colonoscopy    No other significant changes      ROS:  12 Point ROS reviewed and negative except for:  Mild lower abdominal pain    Historical Information   Past Medical History: Diagnosis Date    Asthma      Past Surgical History:   Procedure Laterality Date    ANKLE SURGERY      LEG SURGERY       Social History   Social History     Substance and Sexual Activity   Alcohol Use Never    Frequency: Never     Social History     Substance and Sexual Activity   Drug Use Never     Social History     Tobacco Use   Smoking Status Current Every Day Smoker   Smokeless Tobacco Never Used     Family History: no pertinent family history  No Known Allergies  Meds/Allergies   all current active meds have been reviewed      Objective   Vitals: Blood pressure 100/66, temperature 98 8 °F (37 1 °C), temperature source Oral, resp  rate 12, height 5' 10" (1 778 m), weight 91 6 kg (202 lb)  ,Body mass index is 28 98 kg/m²  Physical Exam:    General appearance: Awake alert oriented no acute distress  HEENT: Sclera clear, anicterus, no discharge  Neck: Trachea is midline, no adenopathy  Lungs: No respiratory distress, clear to auscultation bilaterally  Heart[de-identified] RRR  Abdomen: soft, nondistended, minimal lower abdominal pain  No diffuse rebound guarding or peritoneal signs    Extremities: No edema, nontender  Skin:No rashes or lesions  Neurologic: No weakness or loss of sensation  Psych: Affect appropriate    Molly Morgan MD  3/27/2019

## 2019-07-16 ENCOUNTER — TELEPHONE (OUTPATIENT)
Dept: SURGERY | Facility: CLINIC | Age: 49
End: 2019-07-16

## 2019-07-16 ENCOUNTER — APPOINTMENT (EMERGENCY)
Dept: CT IMAGING | Facility: HOSPITAL | Age: 49
End: 2019-07-16

## 2019-07-16 ENCOUNTER — HOSPITAL ENCOUNTER (EMERGENCY)
Facility: HOSPITAL | Age: 49
End: 2019-07-16
Attending: EMERGENCY MEDICINE | Admitting: EMERGENCY MEDICINE

## 2019-07-16 ENCOUNTER — HOSPITAL ENCOUNTER (INPATIENT)
Facility: HOSPITAL | Age: 49
LOS: 1 days | Discharge: HOME | DRG: 392 | End: 2019-07-18
Attending: SURGERY | Admitting: SURGERY

## 2019-07-16 VITALS
TEMPERATURE: 100.2 F | SYSTOLIC BLOOD PRESSURE: 119 MMHG | RESPIRATION RATE: 18 BRPM | HEART RATE: 90 BPM | DIASTOLIC BLOOD PRESSURE: 65 MMHG | OXYGEN SATURATION: 98 %

## 2019-07-16 DIAGNOSIS — K57.92 DIVERTICULITIS: Primary | ICD-10-CM

## 2019-07-16 DIAGNOSIS — K52.9 COLITIS: ICD-10-CM

## 2019-07-16 DIAGNOSIS — K56.7 ILEUS (HCC): Primary | ICD-10-CM

## 2019-07-16 DIAGNOSIS — K63.2 COLONIC FISTULA: ICD-10-CM

## 2019-07-16 LAB
ALBUMIN SERPL BCP-MCNC: 3.9 G/DL (ref 3.5–5)
ALP SERPL-CCNC: 68 U/L (ref 46–116)
ALT SERPL W P-5'-P-CCNC: 27 U/L (ref 12–78)
ANION GAP SERPL CALCULATED.3IONS-SCNC: 10 MMOL/L (ref 4–13)
AST SERPL W P-5'-P-CCNC: 18 U/L (ref 5–45)
BASOPHILS # BLD AUTO: 0.02 THOUSANDS/ΜL (ref 0–0.1)
BASOPHILS NFR BLD AUTO: 0 % (ref 0–1)
BILIRUB SERPL-MCNC: 1.1 MG/DL (ref 0.2–1)
BUN SERPL-MCNC: 26 MG/DL (ref 5–25)
CALCIUM SERPL-MCNC: 9.4 MG/DL (ref 8.3–10.1)
CHLORIDE SERPL-SCNC: 101 MMOL/L (ref 100–108)
CO2 SERPL-SCNC: 27 MMOL/L (ref 21–32)
CREAT SERPL-MCNC: 1.07 MG/DL (ref 0.6–1.3)
EOSINOPHIL # BLD AUTO: 0 THOUSAND/ΜL (ref 0–0.61)
EOSINOPHIL NFR BLD AUTO: 0 % (ref 0–6)
ERYTHROCYTE [DISTWIDTH] IN BLOOD BY AUTOMATED COUNT: 13.7 % (ref 11.6–15.1)
GFR SERPL CREATININE-BSD FRML MDRD: 82 ML/MIN/1.73SQ M
GLUCOSE SERPL-MCNC: 104 MG/DL (ref 65–140)
HCT VFR BLD AUTO: 41.6 % (ref 36.5–49.3)
HGB BLD-MCNC: 14 G/DL (ref 12–17)
IMM GRANULOCYTES # BLD AUTO: 0.04 THOUSAND/UL (ref 0–0.2)
IMM GRANULOCYTES NFR BLD AUTO: 0 % (ref 0–2)
LIPASE SERPL-CCNC: 30 U/L (ref 73–393)
LYMPHOCYTES # BLD AUTO: 1.57 THOUSANDS/ΜL (ref 0.6–4.47)
LYMPHOCYTES NFR BLD AUTO: 14 % (ref 14–44)
MCH RBC QN AUTO: 30.1 PG (ref 26.8–34.3)
MCHC RBC AUTO-ENTMCNC: 33.7 G/DL (ref 31.4–37.4)
MCV RBC AUTO: 90 FL (ref 82–98)
MONOCYTES # BLD AUTO: 0.97 THOUSAND/ΜL (ref 0.17–1.22)
MONOCYTES NFR BLD AUTO: 9 % (ref 4–12)
NEUTROPHILS # BLD AUTO: 8.48 THOUSANDS/ΜL (ref 1.85–7.62)
NEUTS SEG NFR BLD AUTO: 77 % (ref 43–75)
NRBC BLD AUTO-RTO: 0 /100 WBCS
PLATELET # BLD AUTO: 224 THOUSANDS/UL (ref 149–390)
PMV BLD AUTO: 9.7 FL (ref 8.9–12.7)
POTASSIUM SERPL-SCNC: 4.2 MMOL/L (ref 3.5–5.3)
PROT SERPL-MCNC: 7.6 G/DL (ref 6.4–8.2)
RBC # BLD AUTO: 4.65 MILLION/UL (ref 3.88–5.62)
SODIUM SERPL-SCNC: 138 MMOL/L (ref 136–145)
WBC # BLD AUTO: 11.08 THOUSAND/UL (ref 4.31–10.16)

## 2019-07-16 PROCEDURE — 96376 TX/PRO/DX INJ SAME DRUG ADON: CPT

## 2019-07-16 PROCEDURE — 96361 HYDRATE IV INFUSION ADD-ON: CPT

## 2019-07-16 PROCEDURE — 96365 THER/PROPH/DIAG IV INF INIT: CPT

## 2019-07-16 PROCEDURE — 96375 TX/PRO/DX INJ NEW DRUG ADDON: CPT

## 2019-07-16 PROCEDURE — 80053 COMPREHEN METABOLIC PANEL: CPT

## 2019-07-16 PROCEDURE — 85025 COMPLETE CBC W/AUTO DIFF WBC: CPT

## 2019-07-16 PROCEDURE — 96367 TX/PROPH/DG ADDL SEQ IV INF: CPT

## 2019-07-16 PROCEDURE — 74177 CT ABD & PELVIS W/CONTRAST: CPT

## 2019-07-16 PROCEDURE — 83690 ASSAY OF LIPASE: CPT

## 2019-07-16 PROCEDURE — 36415 COLL VENOUS BLD VENIPUNCTURE: CPT

## 2019-07-16 PROCEDURE — 99285 EMERGENCY DEPT VISIT HI MDM: CPT | Performed by: EMERGENCY MEDICINE

## 2019-07-16 PROCEDURE — 99285 EMERGENCY DEPT VISIT HI MDM: CPT

## 2019-07-16 RX ORDER — HYDROMORPHONE HCL/PF 1 MG/ML
0.5 SYRINGE (ML) INJECTION ONCE
Status: COMPLETED | OUTPATIENT
Start: 2019-07-16 | End: 2019-07-16

## 2019-07-16 RX ORDER — KETOROLAC TROMETHAMINE 30 MG/ML
15 INJECTION, SOLUTION INTRAMUSCULAR; INTRAVENOUS ONCE
Status: COMPLETED | OUTPATIENT
Start: 2019-07-16 | End: 2019-07-16

## 2019-07-16 RX ORDER — SODIUM CHLORIDE 9 MG/ML
100 INJECTION, SOLUTION INTRAVENOUS CONTINUOUS
Status: DISCONTINUED | OUTPATIENT
Start: 2019-07-16 | End: 2019-07-16 | Stop reason: HOSPADM

## 2019-07-16 RX ORDER — CEFAZOLIN SODIUM 2 G/50ML
2000 SOLUTION INTRAVENOUS ONCE
Status: COMPLETED | OUTPATIENT
Start: 2019-07-16 | End: 2019-07-16

## 2019-07-16 RX ADMIN — SODIUM CHLORIDE 1000 ML: 0.9 INJECTION, SOLUTION INTRAVENOUS at 20:56

## 2019-07-16 RX ADMIN — HYDROMORPHONE HYDROCHLORIDE 0.5 MG: 1 INJECTION, SOLUTION INTRAMUSCULAR; INTRAVENOUS; SUBCUTANEOUS at 22:59

## 2019-07-16 RX ADMIN — IOHEXOL 100 ML: 350 INJECTION, SOLUTION INTRAVENOUS at 20:33

## 2019-07-16 RX ADMIN — SODIUM CHLORIDE 100 ML/HR: 0.9 INJECTION, SOLUTION INTRAVENOUS at 23:15

## 2019-07-16 RX ADMIN — METRONIDAZOLE 500 MG: 500 INJECTION, SOLUTION INTRAVENOUS at 22:22

## 2019-07-16 RX ADMIN — HYDROMORPHONE HYDROCHLORIDE 0.5 MG: 1 INJECTION, SOLUTION INTRAMUSCULAR; INTRAVENOUS; SUBCUTANEOUS at 22:00

## 2019-07-16 RX ADMIN — CEFAZOLIN SODIUM 2000 MG: 2 SOLUTION INTRAVENOUS at 21:54

## 2019-07-16 RX ADMIN — KETOROLAC TROMETHAMINE 15 MG: 30 INJECTION, SOLUTION INTRAMUSCULAR at 20:57

## 2019-07-16 NOTE — TELEPHONE ENCOUNTER
Pt of Dr Lynette Davis seen at the ED for Diverticulitis in march  Pt is having some stomach pains where it feels sore like he has done multiple sit-ups  With movement they become sharp  He's had no blood in stool but is concerned  I expressed Dr Lynette Davis is no longer here  He was wondering if he can speak with you  If so please give him a call

## 2019-07-17 PROBLEM — K57.92 DIVERTICULITIS: Status: ACTIVE | Noted: 2019-07-17

## 2019-07-17 LAB
ANION GAP SERPL CALCULATED.3IONS-SCNC: 7 MMOL/L (ref 4–13)
BUN SERPL-MCNC: 26 MG/DL (ref 5–25)
CALCIUM SERPL-MCNC: 8.1 MG/DL (ref 8.3–10.1)
CHLORIDE SERPL-SCNC: 107 MMOL/L (ref 100–108)
CO2 SERPL-SCNC: 22 MMOL/L (ref 21–32)
CREAT SERPL-MCNC: 0.83 MG/DL (ref 0.6–1.3)
ERYTHROCYTE [DISTWIDTH] IN BLOOD BY AUTOMATED COUNT: 13.6 % (ref 11.6–15.1)
GFR SERPL CREATININE-BSD FRML MDRD: 105 ML/MIN/1.73SQ M
GLUCOSE SERPL-MCNC: 100 MG/DL (ref 65–140)
HCT VFR BLD AUTO: 37.2 % (ref 36.5–49.3)
HGB BLD-MCNC: 12.3 G/DL (ref 12–17)
MCH RBC QN AUTO: 29.8 PG (ref 26.8–34.3)
MCHC RBC AUTO-ENTMCNC: 33.1 G/DL (ref 31.4–37.4)
MCV RBC AUTO: 90 FL (ref 82–98)
PLATELET # BLD AUTO: 203 THOUSANDS/UL (ref 149–390)
PMV BLD AUTO: 10 FL (ref 8.9–12.7)
POTASSIUM SERPL-SCNC: 3.6 MMOL/L (ref 3.5–5.3)
RBC # BLD AUTO: 4.13 MILLION/UL (ref 3.88–5.62)
SODIUM SERPL-SCNC: 136 MMOL/L (ref 136–145)
WBC # BLD AUTO: 9.64 THOUSAND/UL (ref 4.31–10.16)

## 2019-07-17 PROCEDURE — 85027 COMPLETE CBC AUTOMATED: CPT | Performed by: SURGERY

## 2019-07-17 PROCEDURE — 36415 COLL VENOUS BLD VENIPUNCTURE: CPT | Performed by: SURGERY

## 2019-07-17 PROCEDURE — 99221 1ST HOSP IP/OBS SF/LOW 40: CPT | Performed by: SURGERY

## 2019-07-17 PROCEDURE — 80048 BASIC METABOLIC PNL TOTAL CA: CPT | Performed by: SURGERY

## 2019-07-17 RX ORDER — HEPARIN SODIUM 5000 [USP'U]/ML
5000 INJECTION, SOLUTION INTRAVENOUS; SUBCUTANEOUS EVERY 8 HOURS SCHEDULED
Status: DISCONTINUED | OUTPATIENT
Start: 2019-07-17 | End: 2019-07-18 | Stop reason: HOSPADM

## 2019-07-17 RX ORDER — OXYCODONE HYDROCHLORIDE 10 MG/1
10 TABLET ORAL EVERY 4 HOURS PRN
Status: DISCONTINUED | OUTPATIENT
Start: 2019-07-17 | End: 2019-07-18 | Stop reason: HOSPADM

## 2019-07-17 RX ORDER — SODIUM CHLORIDE 9 MG/ML
100 INJECTION, SOLUTION INTRAVENOUS CONTINUOUS
Status: DISCONTINUED | OUTPATIENT
Start: 2019-07-17 | End: 2019-07-17

## 2019-07-17 RX ORDER — ALBUTEROL SULFATE 90 UG/1
2 AEROSOL, METERED RESPIRATORY (INHALATION) EVERY 6 HOURS PRN
Status: DISCONTINUED | OUTPATIENT
Start: 2019-07-17 | End: 2019-07-18 | Stop reason: HOSPADM

## 2019-07-17 RX ORDER — ACETAMINOPHEN 325 MG/1
650 TABLET ORAL EVERY 6 HOURS PRN
Status: DISCONTINUED | OUTPATIENT
Start: 2019-07-17 | End: 2019-07-18 | Stop reason: HOSPADM

## 2019-07-17 RX ORDER — CEFAZOLIN SODIUM 1 G/50ML
1000 SOLUTION INTRAVENOUS EVERY 8 HOURS
Status: DISCONTINUED | OUTPATIENT
Start: 2019-07-17 | End: 2019-07-18 | Stop reason: HOSPADM

## 2019-07-17 RX ORDER — OXYCODONE HYDROCHLORIDE 5 MG/1
5 TABLET ORAL EVERY 4 HOURS PRN
Status: DISCONTINUED | OUTPATIENT
Start: 2019-07-17 | End: 2019-07-18 | Stop reason: HOSPADM

## 2019-07-17 RX ADMIN — OXYCODONE HYDROCHLORIDE 5 MG: 5 TABLET ORAL at 16:10

## 2019-07-17 RX ADMIN — METRONIDAZOLE 500 MG: 500 INJECTION, SOLUTION INTRAVENOUS at 16:01

## 2019-07-17 RX ADMIN — HEPARIN SODIUM 5000 UNITS: 5000 INJECTION INTRAVENOUS; SUBCUTANEOUS at 01:56

## 2019-07-17 RX ADMIN — HEPARIN SODIUM 5000 UNITS: 5000 INJECTION INTRAVENOUS; SUBCUTANEOUS at 09:08

## 2019-07-17 RX ADMIN — ALBUTEROL SULFATE 2 PUFF: 90 AEROSOL, METERED RESPIRATORY (INHALATION) at 17:28

## 2019-07-17 RX ADMIN — OXYCODONE HYDROCHLORIDE 5 MG: 5 TABLET ORAL at 02:47

## 2019-07-17 RX ADMIN — OXYCODONE HYDROCHLORIDE 5 MG: 5 TABLET ORAL at 12:00

## 2019-07-17 RX ADMIN — METRONIDAZOLE 500 MG: 500 INJECTION, SOLUTION INTRAVENOUS at 06:19

## 2019-07-17 RX ADMIN — CEFAZOLIN SODIUM 1000 MG: 1 SOLUTION INTRAVENOUS at 14:57

## 2019-07-17 RX ADMIN — SODIUM CHLORIDE 100 ML/HR: 0.9 INJECTION, SOLUTION INTRAVENOUS at 01:57

## 2019-07-17 RX ADMIN — CEFAZOLIN SODIUM 1000 MG: 1 SOLUTION INTRAVENOUS at 21:26

## 2019-07-17 RX ADMIN — HEPARIN SODIUM 5000 UNITS: 5000 INJECTION INTRAVENOUS; SUBCUTANEOUS at 17:24

## 2019-07-17 RX ADMIN — METRONIDAZOLE 500 MG: 500 INJECTION, SOLUTION INTRAVENOUS at 21:30

## 2019-07-17 RX ADMIN — CEFAZOLIN SODIUM 1000 MG: 1 SOLUTION INTRAVENOUS at 05:24

## 2019-07-17 RX ADMIN — ACETAMINOPHEN 650 MG: 325 TABLET ORAL at 02:46

## 2019-07-17 NOTE — H&P
H&P Exam - General Surgery   Nasima Sherwood Sr 54 y o  male MRN: [id number]  Unit/Bed#: ED 03 Encounter: [id number]    Assessment/Plan     Assessment:  Patient is 62-year-old male with recent history of 1st episode of perforated diverticulitis managed conservatively with antibiotics now presents with lower abdominal pain suspicious for diverticulitis  Plan:  NPO  Ancef/flagyl  NS @ 100  Serial abdominal exams to monitor for acute changes      History of Present Illness     HPI: Patient is 62-year-old male with recent history of 1st episode of perforated diverticulitis managed conservatively with antibiotics now presents with lower abdominal pain that started on Monday morning  Pain is coming gone since then patient came in due to lack of resolution of symptoms  He endorses decreased appetite, and fever of 101 which was relieved with Advil  Last bowel movement was on Monday and was loose  Patient states that pain is different from previous in that it is less intense and located mainly in lower abdomen and not LLQ  Denies n/v, currently afebrile  WBC 11 08  CT showed possible fistula between small bowel and colon and chronic thickening of small bowel in LLQ similar to previous study as well as circumferential thickening of the sigmoid colon  Of note, pt states that he has always had stomach issues when I inquired more about this he reported bouts of crampy abdominal pain that occur a couple times a month and are accompanied by loose stools  Review of Systems   Constitutional: Positive for appetite change and fever  Negative for chills  Respiratory: Negative for shortness of breath  Cardiovascular: Negative for chest pain  Gastrointestinal: Positive for abdominal pain and diarrhea  Negative for abdominal distention, blood in stool, constipation, nausea, rectal pain and vomiting  Genitourinary: Negative for difficulty urinating         Historical Information   Past Medical History:   Diagnosis Date  Asthma      Past Surgical History:   Procedure Laterality Date    ANKLE SURGERY      LEG SURGERY       Social History   Social History     Substance and Sexual Activity   Alcohol Use Not Currently    Frequency: Never    Comment: occ     Social History     Substance and Sexual Activity   Drug Use Never     Social History     Tobacco Use   Smoking Status Current Every Day Smoker    Packs/day: 1 00    Types: Cigarettes   Smokeless Tobacco Never Used     Family History:   Family History   Problem Relation Age of Onset    Parkinsonism Mother     Diabetes Father     Breast cancer Sister     Lung cancer Sister        Meds/Allergies   PTA meds:   Prior to Admission Medications   Prescriptions Last Dose Informant Patient Reported? Taking? albuterol (PROVENTIL HFA,VENTOLIN HFA) 90 mcg/act inhaler Past Month at Unknown time  Yes Yes   Sig: Inhale 2 puffs every 6 (six) hours as needed for wheezing      Facility-Administered Medications: None     No Known Allergies    Objective   First Vitals:   Blood Pressure: 133/66 (07/17/19 0003)  Pulse: 94 (07/17/19 0003)  Temperature: 98 5 °F (36 9 °C) (07/17/19 0003)  Temp Source: Oral (07/17/19 0003)  Respirations: 16 (07/17/19 0003)  Height: 5' 10" (177 8 cm) (07/17/19 0003)  Weight - Scale: 90 7 kg (200 lb) (07/17/19 0003)  SpO2: 98 % (07/17/19 0003)    Current Vitals:   Blood Pressure: 133/66 (07/17/19 0003)  Pulse: 94 (07/17/19 0003)  Temperature: 98 5 °F (36 9 °C) (07/17/19 0003)  Temp Source: Oral (07/17/19 0003)  Respirations: 16 (07/17/19 0003)  Height: 5' 10" (177 8 cm) (07/17/19 0003)  Weight - Scale: 90 7 kg (200 lb) (07/17/19 0003)  SpO2: 98 % (07/17/19 0003)    No intake or output data in the 24 hours ending 07/17/19 0053    Invasive Devices     Peripheral Intravenous Line            Peripheral IV 07/16/19 Right Antecubital less than 1 day                Physical Exam   Constitutional: He is oriented to person, place, and time   He appears well-developed and well-nourished  No distress  HENT:   Head: Normocephalic and atraumatic  Eyes: EOM are normal    Neck: Normal range of motion  Cardiovascular: Normal rate, regular rhythm and normal heart sounds  Exam reveals no gallop  No murmur heard  Pulmonary/Chest: Effort normal and breath sounds normal  No stridor  No respiratory distress  He has no wheezes  He has no rales  Abdominal: Soft  He exhibits no distension and no mass  There is tenderness  There is no rebound and no guarding  Mild RLQ tenderness of palpation   Neurological: He is alert and oriented to person, place, and time  Skin: Skin is warm and dry  Psychiatric: He has a normal mood and affect  His behavior is normal  Judgment and thought content normal        Lab Results:   CBC:   Lab Results   Component Value Date    WBC 11 08 (H) 07/16/2019    HGB 14 0 07/16/2019    HCT 41 6 07/16/2019    MCV 90 07/16/2019     07/16/2019    MCH 30 1 07/16/2019    MCHC 33 7 07/16/2019    RDW 13 7 07/16/2019    MPV 9 7 07/16/2019    NRBC 0 07/16/2019   , CMP:   Lab Results   Component Value Date    SODIUM 138 07/16/2019    K 4 2 07/16/2019     07/16/2019    CO2 27 07/16/2019    BUN 26 (H) 07/16/2019    CREATININE 1 07 07/16/2019    CALCIUM 9 4 07/16/2019    AST 18 07/16/2019    ALT 27 07/16/2019    ALKPHOS 68 07/16/2019    EGFR 82 07/16/2019   , Coagulation: No results found for: PT, INR, APTT, Urinalysis: No results found for: Carly Silk, SPECGRAV, PHUR, LEUKOCYTESUR, NITRITE, PROTEINUA, GLUCOSEU, KETONESU, BILIRUBINUR, BLOODU  Imaging: I have personally reviewed pertinent reports      No orders to display

## 2019-07-17 NOTE — EMTALA/ACUTE CARE TRANSFER
[address] [facility] [address]  [address]  [facility] [address]  Dept: [phone]      EMTALA TRANSFER CONSENT    NAME Dallas Ferguson Sr                                         1970                              MRN [id number]    I have been informed of my rights regarding examination, treatment, and transfer   by Dr Terrie Negrete DO    Benefits: Specialized equipment and/or services available at the receiving facility (Include comment)________________________, Continuity of care    Risks: Potential for delay in receiving treatment, Potential deterioration of medical condition, Loss of IV, Increased discomfort during transfer, Possible worsening of condition or death during transfer      Consent for Transfer:  I acknowledge that my medical condition has been evaluated and explained to me by the emergency department physician or other qualified medical person and/or my attending physician, who has recommended that I be transferred to the service of  Accepting Physician: Jovi Vitale  at [address] Name, Mariposa Pereyra : NAZ   The above potential benefits of such transfer, the potential risks associated with such transfer, and the probable risks of not being transferred have been explained to me, and I fully understand them  The doctor has explained that, in my case, the benefits of transfer outweigh the risks  I agree to be transferred  I authorize the performance of emergency medical procedures and treatments upon me in both transit and upon arrival at the receiving facility  Additionally, I authorize the release of any and all medical records to the receiving facility and request they be transported with me, if possible  I understand that the safest mode of transportation during a medical emergency is an ambulance and that the Hospital advocates the use of this mode of transport   Risks of traveling to the receiving facility by car, including absence of medical control, life sustaining equipment, such as oxygen, and medical personnel has been explained to me and I fully understand them  (PETRA CORRECT BOX BELOW)  [  ]  I consent to the stated transfer and to be transported by ambulance/helicopter  [  ]  I consent to the stated transfer, but refuse transportation by ambulance and accept full responsibility for my transportation by car  I understand the risks of non-ambulance transfers and I exonerate the Hospital and its staff from any deterioration in my condition that results from this refusal     X___________________________________________    DATE  19  TIME________  Signature of patient or legally responsible individual signing on patient behalf           RELATIONSHIP TO PATIENT_________________________          Provider Certification    NAME Tiffanie [facility]                                         1970                              MRN [id number]    A medical screening exam was performed on the above named patient  Based on the examination:    Condition Necessitating Transfer The primary encounter diagnosis was Ileus ([address] )  Diagnoses of Colonic fistula and Colitis were also pertinent to this visit      Patient Condition: The patient has been stabilized such that within reasonable medical probability, no material deterioration of the patient condition or the condition of the unborn child(ahsan) is likely to result from the transfer    Reason for Transfer: Level of Care needed not available at this facility    Transfer Requirements: Facility [facility] [place]    · Space available and qualified personnel available for treatment as acknowledged by    · Agreed to accept transfer and to provide appropriate medical treatment as acknowledged by       Cindy Cross   · Appropriate medical records of the examination and treatment of the patient are provided at the time of transfer   [address], [address] _______  · Transfer will be performed by qualified personnel from    and appropriate transfer equipment as required, including the use of necessary and appropriate life support measures  Provider Certification: I have examined the patient and explained the following risks and benefits of being transferred/refusing transfer to the patient/family:  General risk, such as traffic hazards, adverse weather conditions, rough terrain or turbulence, possible failure of equipment (including vehicle or aircraft), or consequences of actions of persons outside the control of the transport personnel, Unanticipated needs of medical equipment and personnel during transport, Risk of worsening condition, The possibility of a transport vehicle being unavailable      Based on these reasonable risks and benefits to the patient and/or the unborn child(ahsan), and based upon the information available at the time of the patients examination, I certify that the medical benefits reasonably to be expected from the provision of appropriate medical treatments at another medical facility outweigh the increasing risks, if any, to the individuals medical condition, and in the case of labor to the unborn child, from effecting the transfer      X____________________________________________ DATE 07/16/19        TIME_______      ORIGINAL - SEND TO MEDICAL RECORDS   COPY - SEND WITH PATIENT DURING TRANSFER

## 2019-07-17 NOTE — UTILIZATION REVIEW
Initial Clinical Review    Admission: Date/Time/Statement: 7/17/19 AT 0132      Orders Placed This Encounter   Procedures    Inpatient Admission     Standing Status:   Standing     Number of Occurrences:   1     Order Specific Question:   Admitting Physician     Answer:   Jan Wilkes [[id number]]     Order Specific Question:   Level of Care     Answer:   Med Surg [[id number]]     Order Specific Question:   Estimated length of stay     Answer:   More than 2 Midnights     Order Specific Question:   Certification     Answer:   I certify that inpatient services are medically necessary for this patient for a duration of greater than two midnights  See H&P and MD Progress Notes for additional information about the patient's course of treatment  ED Arrival Information     Expected Arrival Acuity Means of Arrival Escorted By Service Admission Type    7/16/2019 7/16/2019 23:59 Urgent Ambulance [facility]) Surgery-General Urgent    Arrival Complaint     Chief Complaint   Patient presents with    Abdominal Pain     Patient transferred from [facility] as private for Red Sx for further evaluation of fistula and ileus     Assessment/Plan:  54YEAR OLD MALE WITH HX ASTHMA + RECENT FIRST EPISODE OF PERFORATED SIGMOID DIVERTICULITIS IN MARCH MANAGED CONSERVATIVELY WITH ANTIBIOTICS  TO [facility] ED 7/16/19 2ND 1 DAY HX DIFFUSE ABDOMINAL PAIN WORSE IN LOWER QUADRANTS + FEVER 101  WBC > 11,000  CT ABDOMEN PELVIS SUSPICIOUS FOR FISTULA + LIKELY ILEUS  TRANSFERRED TO [facility] 7/17/19 FOR MANAGEMENT WITH IVF, IV ABX, PAIN MANAGEMENT + SURGICAL EVALUATION TO RULE OUT FISTULA + OTHER ETIOLOGIES       ED Triage Vitals [07/17/19 0003]   Temperature Pulse Respirations Blood Pressure SpO2   98 5 °F (36 9 °C) 94 16 133/66 98 %      Temp Source Heart Rate Source Patient Position - Orthostatic VS BP Location FiO2 (%)   Oral Monitor Sitting Left arm --      Pain Score       2        Wt Readings from Last 1 Encounters:   07/17/19 90 7 kg (200 lb)     Additional Vital Signs:   07/17/19 0908  --  95  18  133/75  98 %    07/17/19 0628  --  90  16  132/70  97 %    07/17/19 0200  --  90  16  133/65  97 %    07/17/19 0003  98 5 °F (36 9 °C)  94  16  133/66  98 %      Pertinent Labs/Diagnostic Test Results:   Results from last 7 days   Lab Units 07/17/19  0515 07/16/19 1958   WBC Thousand/uL 9 64 11 08*   HEMOGLOBIN g/dL 12 3 14 0   HEMATOCRIT % 37 2 41 6   PLATELETS Thousands/uL 203 224   NEUTROS ABS Thousands/µL  --  8 48*     Results from last 7 days   Lab Units 07/17/19 0515 07/16/19 1958   SODIUM mmol/L 136 138   POTASSIUM mmol/L 3 6 4 2   CHLORIDE mmol/L 107 101   CO2 mmol/L 22 27   ANION GAP mmol/L 7 10   BUN mg/dL 26* 26*   CREATININE mg/dL 0 83 1 07   EGFR ml/min/1 73sq m 105 82   CALCIUM mg/dL 8 1* 9 4     Results from last 7 days   Lab Units 07/16/19 1958   AST U/L 18   ALT U/L 27   ALK PHOS U/L 68   TOTAL PROTEIN g/dL 7 6   ALBUMIN g/dL 3 9   TOTAL BILIRUBIN mg/dL 1 10*     Results from last 7 days   Lab Units 07/17/19  0515 07/16/19 1958   GLUCOSE RANDOM mg/dL 100 104     Results from last 7 days   Lab Units 07/16/19 1958   LIPASE u/L 30*     CT ABDOMEN PELVIS -  Chronic thickening of multiple loops of small bowel in the left lower quadrant persistent from the prior study  Mild chronic circumferential thickening of the proximal sigmoid colon (without asymmetric focal thickening to indicate acute diverticulitis)  There is a linear tract extending from one of the loops of inflamed small bowel to the sigmoid colon best seen on #601/82 suspicious for a fistula  Fluid-filled mildly dilated proximal small bowel and proximal colon likely secondary to an ileus      ED Treatment:   Medication Administration from 07/16/2019 2334 to 07/17/2019 1339      Date/Time Order Dose Route Action    07/17/2019 0157 sodium chloride 0 9 % infusion 100 mL/hr Intravenous New Bag    07/17/2019 0908 heparin (porcine) subcutaneous injection 5,000 Units 5,000 Units Subcutaneous Given    07/17/2019 0156 heparin (porcine) subcutaneous injection 5,000 Units 5,000 Units Subcutaneous Given    07/17/2019 0246 acetaminophen (TYLENOL) tablet 650 mg 650 mg Oral Given    07/17/2019 1200 oxyCODONE (ROXICODONE) IR tablet 5 mg 5 mg Oral Given    07/17/2019 0247 oxyCODONE (ROXICODONE) IR tablet 5 mg 5 mg Oral Given    07/17/2019 0618 ceFAZolin (ANCEF) IVPB (premix) 1,000 mg 0 mg Intravenous Stopped    07/17/2019 0524 ceFAZolin (ANCEF) IVPB (premix) 1,000 mg 1,000 mg Intravenous New Bag    07/17/2019 0728 metroNIDAZOLE (FLAGYL) IVPB (premix) 500 mg 0 mg Intravenous Stopped    07/17/2019 0619 metroNIDAZOLE (FLAGYL) IVPB (premix) 500 mg 500 mg Intravenous New Bag     Past Medical History:   Diagnosis Date    Asthma      Admitting Diagnosis: Abdominal pain [R10 9]    Age/Sex: 54 y o  male    Admission Orders:  NPO    Current Facility-Administered Medications:  acetaminophen 650 mg Oral Q6H PRN  GIVEN X 1   albuterol 2 puff Inhalation Q6H PRN   cefazolin 1,000 mg Intravenous Q8H   heparin (porcine) 5,000 Units Subcutaneous Q8H [facility]   metroNIDAZOLE 500 mg Intravenous Q8H   oxyCODONE 10 mg Oral Q4H PRN   oxyCODONE 5 mg Oral Q4H PRN   GIVEN X 2   sodium chloride 100 mL/hr Intravenous Continuous     Network Utilization Review Department  Phone: [phone]; Fax [phone]  [email or web address]  ATTENTION: Please call with any questions or concerns to [phone]  and carefully listen to the prompts so that you are directed to the right person  Send all requests for admission clinical reviews, approved or denied determinations and any other requests to fax [phone]   All voicemails are confidential

## 2019-07-17 NOTE — ED NOTES
[facility] 111[address]  [facility] ED Dr Cindy Cross accepting  # [phone] for report     Gin Pal RN  07/16/19 1978

## 2019-07-17 NOTE — ED PROVIDER NOTES
History  Chief Complaint   Patient presents with    Abdominal Pain     started monday night bilateral lower quadrant pain  denies nausea vomiting or diarrhea     55 y/o male presents to the ED for diffuse abd pain since yesterday morning  Patient states that he has had constant, diffuse, achy abdominal pain since yesterday  States that pain is worse in his BLQ and that it is intermittently sharp/stabbing with movement  He denies any nausea or vomiting  Denies any diarrhea/constipation or blood in his stool  He does report that he had a fever last night to 101 and took Advil  States that today he took Advil around 3:00 p m  He denies any abdominal surgeries in the past and states that he does have a history of perforated sigmoid diverticulitis in which she was admitted for months ago and placed on IV antibiotics  He denies any abdominal surgeries at that time  Denies any urinary symptoms or known sick contacts  He denies any chest pain, cough, headache, sore throat, or ear pain  No other complaints  History provided by:  Patient  Abdominal Pain   Pain location:  Generalized  Pain radiates to:  Does not radiate  Pain severity:  Moderate  Onset quality:  Sudden  Duration:  1 day  Timing:  Constant  Progression:  Worsening  Chronicity:  New  Context: not previous surgeries and not sick contacts    Relieved by:  Nothing  Worsened by: Movement  Ineffective treatments:  None tried  Associated symptoms: no chest pain, no chills, no constipation, no cough, no diarrhea, no dysuria, no fever, no hematuria, no nausea, no shortness of breath, no sore throat and no vomiting        Prior to Admission Medications   Prescriptions Last Dose Informant Patient Reported? Taking?    albuterol (PROVENTIL HFA,VENTOLIN HFA) 90 mcg/act inhaler   Yes No   Sig: Inhale 2 puffs every 6 (six) hours as needed for wheezing   meloxicam (MOBIC) 15 mg tablet   Yes No   Sig: Take 15 mg by mouth daily      Facility-Administered Medications: None       Past Medical History:   Diagnosis Date    Asthma        Past Surgical History:   Procedure Laterality Date    ANKLE SURGERY      LEG SURGERY         Family History   Problem Relation Age of Onset   Keshia Munoz Parkinsonism Mother     Diabetes Father     Breast cancer Sister     Lung cancer Sister      I have reviewed and agree with the history as documented  Social History     Tobacco Use    Smoking status: Current Every Day Smoker     Packs/day: 1 00     Types: Cigarettes    Smokeless tobacco: Never Used   Substance Use Topics    Alcohol use: Not Currently     Frequency: Never     Comment: occ    Drug use: Never        Review of Systems   Constitutional: Negative for chills and fever  HENT: Negative for congestion, ear pain and sore throat  Eyes: Negative for pain and visual disturbance  Respiratory: Negative for cough, shortness of breath and wheezing  Cardiovascular: Negative for chest pain and leg swelling  Gastrointestinal: Positive for abdominal pain  Negative for constipation, diarrhea, nausea and vomiting  Genitourinary: Negative for dysuria, frequency, hematuria and urgency  Musculoskeletal: Negative for neck pain and neck stiffness  Skin: Negative for rash and wound  Neurological: Negative for weakness, numbness and headaches  Psychiatric/Behavioral: Negative for agitation and confusion  All other systems reviewed and are negative  Physical Exam  Physical Exam   Constitutional: He is oriented to person, place, and time  He appears well-developed and well-nourished  HENT:   Head: Normocephalic and atraumatic  Mouth/Throat: Oropharynx is clear and moist    Eyes: Pupils are equal, round, and reactive to light  EOM are normal    Neck: Normal range of motion  Neck supple  Cardiovascular: Normal rate and regular rhythm  Pulmonary/Chest: Effort normal and breath sounds normal    Abdominal: Soft  Bowel sounds are normal  He exhibits no distension  There is generalized tenderness  There is no rigidity, no rebound and no guarding  Worse in RLQ and LLQ  No rebound, rigidity or guarding    Musculoskeletal: Normal range of motion  Neurological: He is alert and oriented to person, place, and time  No focal deficits   Skin: Skin is warm and dry  Nursing note and vitals reviewed        Vital Signs  ED Triage Vitals   Temperature Pulse Respirations Blood Pressure SpO2   07/16/19 1901 07/16/19 1901 07/16/19 1901 07/16/19 1901 07/16/19 1901   100 2 °F (37 9 °C) 104 18 119/55 96 %      Temp Source Heart Rate Source Patient Position - Orthostatic VS BP Location FiO2 (%)   07/16/19 1901 07/16/19 2059 07/16/19 1901 07/16/19 1901 --   Oral Monitor Sitting Left arm       Pain Score       07/16/19 1901       6           Vitals:    07/16/19 1901 07/16/19 2059 07/16/19 2300   BP: 119/55 122/65 119/65   Pulse: 104 89 90   Patient Position - Orthostatic VS: Sitting Lying Lying         Visual Acuity      ED Medications  Medications   sodium chloride 0 9 % infusion (has no administration in time range)   ketorolac (TORADOL) injection 15 mg (15 mg Intravenous Given 7/16/19 2057)   sodium chloride 0 9 % bolus 1,000 mL (0 mL Intravenous Stopped 7/16/19 2258)   iohexol (OMNIPAQUE) 350 MG/ML injection (MULTI-DOSE) 100 mL (100 mL Intravenous Given 7/16/19 2033)   ceFAZolin (ANCEF) IVPB (premix) 2,000 mg (0 mg Intravenous Stopped 7/16/19 2221)   metroNIDAZOLE (FLAGYL) IVPB (premix) 500 mg (0 mg Intravenous Stopped 7/16/19 2258)   HYDROmorphone (DILAUDID) injection 0 5 mg (0 5 mg Intravenous Given 7/16/19 2200)   HYDROmorphone (DILAUDID) injection 0 5 mg (0 5 mg Intravenous Given 7/16/19 2259)       Diagnostic Studies  Results Reviewed     Procedure Component Value Units Date/Time    UA w Reflex to Microscopic w Reflex to Culture [[id number]]     Lab Status:  No result Specimen:  Urine     Comprehensive metabolic panel [[id number]]  (Abnormal) Collected:  07/16/19 1958    Lab Status: Final result Specimen:  Blood from Arm, Right Updated:  07/16/19 2023     Sodium 138 mmol/L      Potassium 4 2 mmol/L      Chloride 101 mmol/L      CO2 27 mmol/L      ANION GAP 10 mmol/L      BUN 26 mg/dL      Creatinine 1 07 mg/dL      Glucose 104 mg/dL      Calcium 9 4 mg/dL      AST 18 U/L      ALT 27 U/L      Alkaline Phosphatase 68 U/L      Total Protein 7 6 g/dL      Albumin 3 9 g/dL      Total Bilirubin 1 10 mg/dL      eGFR 82 ml/min/1 73sq m     Narrative:       National Kidney Disease Foundation guidelines for Chronic Kidney Disease (CKD):     Stage 1 with normal or high GFR (GFR > 90 mL/min/1 73 square meters)    Stage 2 Mild CKD (GFR = 60-89 mL/min/1 73 square meters)    Stage 3A Moderate CKD (GFR = 45-59 mL/min/1 73 square meters)    Stage 3B Moderate CKD (GFR = 30-44 mL/min/1 73 square meters)    Stage 4 Severe CKD (GFR = 15-29 mL/min/1 73 square meters)    Stage 5 End Stage CKD (GFR <15 mL/min/1 73 square meters)  Note: GFR calculation is accurate only with a steady state creatinine    Lipase [[id number]]  (Abnormal) Collected:  07/16/19 1958    Lab Status:  Final result Specimen:  Blood from Arm, Right Updated:  07/16/19 2023     Lipase 30 u/L     CBC and differential [[id number]]  (Abnormal) Collected:  07/16/19 1958    Lab Status:  Final result Specimen:  Blood from Arm, Right Updated:  07/16/19 2006     WBC 11 08 Thousand/uL      RBC 4 65 Million/uL      Hemoglobin 14 0 g/dL      Hematocrit 41 6 %      MCV 90 fL      MCH 30 1 pg      MCHC 33 7 g/dL      RDW 13 7 %      MPV 9 7 fL      Platelets 571 Thousands/uL      nRBC 0 /100 WBCs      Neutrophils Relative 77 %      Immat GRANS % 0 %      Lymphocytes Relative 14 %      Monocytes Relative 9 %      Eosinophils Relative 0 %      Basophils Relative 0 %      Neutrophils Absolute 8 48 Thousands/µL      Immature Grans Absolute 0 04 Thousand/uL      Lymphocytes Absolute 1 57 Thousands/µL      Monocytes Absolute 0 97 Thousand/µL      Eosinophils Absolute 0 00 Thousand/µL      Basophils Absolute 0 02 Thousands/µL                  CT abdomen pelvis with contrast   Final Result by Heydi Marin MD (07/16 2052)      Chronic thickening of multiple loops of small bowel in the left lower quadrant persistent from the prior study  Mild chronic circumferential thickening of the proximal sigmoid colon (without asymmetric focal thickening to indicate acute diverticulitis)  There is a linear tract extending from one of the loops of inflamed small bowel to the sigmoid colon best seen on #601/82 suspicious for a fistula  Fluid-filled mildly dilated proximal small bowel and proximal colon likely secondary to an ileus  The study was marked in [facility] for immediate notification  Workstation performed: [id number]                    Procedures  Procedures       ED Course  ED Course as of Jul 16 2323   Tue Jul 16, 2019 2214 Spoke with Dr Satira Schaumann from surgery at - recommends transfer to [facility] and antibiotics  Patient updated and is agreeable to transfer  Order placed in PACs immediately and pacs is working on transfer                                   MDM  Number of Diagnoses or Management Options  Colitis: new and requires workup  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  Ileus [facility]): new and requires workup  Diagnosis management comments: Patient with abd pain- will get labs, ct abd/pel, and give pain meds  Will reassess              Amount and/or Complexity of Data Reviewed  Clinical lab tests: ordered and reviewed  Tests in the radiology section of CPT®: ordered and reviewed  Tests in the medicine section of CPT®: ordered and reviewed  Discussion of test results with the performing providers: yes  Decide to obtain previous medical records or to obtain history from someone other than the patient: yes  Obtain history from someone other than the patient: yes  Review and summarize past medical records: yes  Discuss the patient with other providers: yes  Independent visualization of images, tracings, or specimens: yes    Patient Progress  Patient progress: improved      Disposition  Final diagnoses:   Ileus ([address] )   Colonic fistula   Colitis     Time reflects when diagnosis was documented in both MDM as applicable and the Disposition within this note     Time User Action Codes Description Comment    7/16/2019  9:11 PM Tanisha Everett Add [K56 7] Ileus ([address] )     7/16/2019  9:11 PM Nicho Og A Add [K63 2] Colonic fistula     7/16/2019  9:12 PM Meghna Ross Add [K52 9] Colitis       ED Disposition     ED Disposition Condition Date/Time Comment    Transfer to Another [address] Jul 16, 2019 10:44 PM Veneda Runner Sr should be transferred out to B          MD Documentation      Most Recent Value   Patient Condition  The patient has been stabilized such that within reasonable medical probability, no material deterioration of the patient condition or the condition of the unborn child(ahsan) is likely to result from the transfer   Reason for Transfer  Level of Care needed not available at this facility   Benefits of Transfer  Specialized equipment and/or services available at the receiving facility (Include comment)________________________, Continuity of care   Risks of Transfer  Potential for delay in receiving treatment, Potential deterioration of medical condition, Loss of IV, Increased discomfort during transfer, Possible worsening of condition or death during transfer   Accepting Physician  [address], [address]     Sending MD Rae Diaz   Provider Certification  General risk, such as traffic hazards, adverse weather conditions, rough terrain or turbulence, possible failure of equipment (including vehicle or aircraft), or consequences of actions of persons outside the control of the transport personnel, Unanticipated needs of medical equipment and personnel during transport, Risk of worsening condition, The possibility of a transport vehicle being unavailable      RN Documentation      [facility] [address] [place] [address], [address]   [facility]       Follow-up Information    None         Patient's Medications   Discharge Prescriptions    No medications on file     No discharge procedures on file      ED Provider  Electronically Signed by           Joanna Morin DO  07/16/19 9574

## 2019-07-18 VITALS
SYSTOLIC BLOOD PRESSURE: 110 MMHG | OXYGEN SATURATION: 95 % | RESPIRATION RATE: 18 BRPM | HEART RATE: 82 BPM | BODY MASS INDEX: 28.63 KG/M2 | DIASTOLIC BLOOD PRESSURE: 63 MMHG | WEIGHT: 200 LBS | TEMPERATURE: 98.4 F | HEIGHT: 70 IN

## 2019-07-18 PROCEDURE — 99238 HOSP IP/OBS DSCHRG MGMT 30/<: CPT | Performed by: PHYSICIAN ASSISTANT

## 2019-07-18 PROCEDURE — NC001 PR NO CHARGE: Performed by: SURGERY

## 2019-07-18 RX ORDER — OXYCODONE HYDROCHLORIDE 5 MG/1
2.5-5 TABLET ORAL EVERY 4 HOURS PRN
Qty: 18 TABLET | Refills: 0 | Status: SHIPPED | OUTPATIENT
Start: 2019-07-18

## 2019-07-18 RX ORDER — AMOXICILLIN AND CLAVULANATE POTASSIUM 875; 125 MG/1; MG/1
1 TABLET, FILM COATED ORAL 2 TIMES DAILY
Qty: 20 TABLET | Refills: 0 | Status: SHIPPED | OUTPATIENT
Start: 2019-07-18 | End: 2019-07-28

## 2019-07-18 RX ORDER — ACETAMINOPHEN 325 MG/1
650 TABLET ORAL EVERY 6 HOURS PRN
Qty: 30 TABLET | Refills: 0
Start: 2019-07-18

## 2019-07-18 RX ADMIN — CEFAZOLIN SODIUM 1000 MG: 1 SOLUTION INTRAVENOUS at 05:15

## 2019-07-18 RX ADMIN — OXYCODONE HYDROCHLORIDE 10 MG: 10 TABLET ORAL at 05:14

## 2019-07-18 RX ADMIN — HEPARIN SODIUM 5000 UNITS: 5000 INJECTION INTRAVENOUS; SUBCUTANEOUS at 05:14

## 2019-07-18 RX ADMIN — METRONIDAZOLE 500 MG: 500 INJECTION, SOLUTION INTRAVENOUS at 05:56

## 2019-07-18 NOTE — DISCHARGE INSTRUCTIONS
Acute Care Surgery Discharge Instructions    Please follow-up as instructed or on an as needed basis  If you need a follow-up appointment, please call the office when you leave to schedule an appointment  Activity:  - You may resume activity as tolerated  Return to work:    - You are clear to return to work on discharge  Diet:    - You may resume your normal diet  Medications:  - Please complete the entire course of antibiotics, Augmentin, through the last dose on 07/28/2019   - You may resume all of your regular medications, including blood thinners and aspirin, after going home unless otherwise instructed  Please refer to your discharge medication list for further details  - Please take the pain medications as directed  - You are encouraged to use non-narcotic pain medications first and whenever possible  Reserve the use of narcotic pain medication for moderate to severe pain not controlled by non-narcotic medications   - No driving while taking narcotic pain medications  - You may become constipated, especially if taking pain medications  You may take any over the counter stool softeners or laxatives as needed  Examples: Milk of Magnesia, Colace, Senna  Additional Instructions:  - May shower daily and/or bathe normally  - If you have any questions or concerns after discharge please call the office   - Call office or return to ER if fever greater than 101, chills, persistent nausea/vomiting, and/or worsening/uncontrollable pain  Opioid Analgesia Discharge Instructions: You were prescribed a multi-modal pain regimen this admission for treatment of your pain  It is important to use multiple agents to control your pain, not just a narcotic medication  You should wean down the narcotic medication first (oxycodone)   It is not expected that you will require a narcotic medication for longer than a few days but you may remain on other pain medications for a longer period of time in order to control your pain  While you are taking narcotics (i e  Oxycodone), you should monitor for constipation (no bowel movement in 48 hours)  It is recommended you start taking over the counter treatments if needed to avoid complications from constipation  You may have been prescribed a stool softner such as colace or senna to take while you are taking a narcotic medication to help prevent constipation  Examples of over the counter medications for constipation are Milk of Magnesia, Colace, Senna  You will be re-evaluated in a few weeks following discharge from the hospital  If you need any refills on your medications, or if you are not able to wean down your narcotics, please call the office

## 2019-07-18 NOTE — ASSESSMENT & PLAN NOTE
- Recurrent diverticulitis with possible colo-enteral fistula  - Diet as tolerated  - Complete course of oral antibiotics through the last dose on 07/28/2019  - Activity as tolerated  - Analgesia as needed  - Recommend short-term outpatient follow-up in the [address] [place] [address] for re-evaluation   - Recommend short-term outpatient follow-up with Gastroenterology to discuss/arrange elective colonoscopy for further evaluation

## 2019-07-18 NOTE — PLAN OF CARE
Problem: Potential for Falls  Goal: Patient will remain free of falls  Description  INTERVENTIONS:  - Assess patient frequently for physical needs  -  Identify cognitive and physical deficits and behaviors that affect risk of falls    -  [place] fall precautions as indicated by assessment   - Educate patient/family on patient safety including physical limitations  - Instruct patient to call for assistance with activity based on assessment  - Modify environment to reduce risk of injury  - Consider OT/PT consult to assist with strengthening/mobility  Outcome: Progressing

## 2019-07-18 NOTE — DISCHARGE SUMMARY
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  - Diet as tolerated  - Complete course of oral antibiotics through the last dose on 07/28/2019  - Activity as tolerated  - Analgesia as needed  - Recommend short-term outpatient follow-up in the [address] [place] [address] for re-evaluation   - Recommend short-term outpatient follow-up with Gastroenterology to discuss/arrange elective colonoscopy for further evaluation  Discharge Summary - General Surgery   Maricel Sutherland Sr 54 y o  male MRN: [id number]  Unit/Bed#: -01 Encounter: [id number]    Admission Date: 7/16/2019     Discharge Date: 7/18/2019    Admitting Diagnosis: Abdominal pain [R10 9]    Discharge Diagnosis: See above  Attending and Service: Dr Riky Kern  Consulting Physician(s): None  Imaging and Procedures Performed:     Ct Abdomen Pelvis With Contrast    Result Date: 7/16/2019  Impression: Chronic thickening of multiple loops of small bowel in the left lower quadrant persistent from the prior study  Mild chronic circumferential thickening of the proximal sigmoid colon (without asymmetric focal thickening to indicate acute diverticulitis)  There is a linear tract extending from one of the loops of inflamed small bowel to the sigmoid colon best seen on #601/82 suspicious for a fistula  Fluid-filled mildly dilated proximal small bowel and proximal colon likely secondary to an ileus  The study was marked in [facility] for immediate notification   Workstation performed: [id number]     Pathology: N/A    Hospital Course: Dhaval Ibarra is a 74-year-old male who presents with abdominal pain associated with decreased appetite and a fever as high as 101  He also notes his last bowel movement was more loose in nature than his normal bowel habits  His abdominal pain is primarily located in his lower abdomen, but not isolated to the left lower side  He denied any associated nausea or vomiting  He does admit to having chronically has some stomach issues," but noted that he had only episodes of crampy abdominal pain intermittently accompanied by loose stools, but not similar to his current symptoms  On his initial surgical evaluation, he was afebrile with normal vital signs; his abdomen was soft and nondistended, but was tender in the right lower quadrant without peritonitis; the remainder of his exam was unremarkable  His initial workup included laboratory studies as well as the above-noted imaging study  He was admitted to the acute care surgery service with recurrent diverticulitis and likely associated ileus  He was initially kept NPO, started on IV fluids for resuscitation, started on IV antibiotics, placed on analgesic regimen and was to have serial abdominal exams  Over the next 24 hours, his abdominal pain improved  He was able to be advanced to a regular diet  Once tolerating an oral diet, his IV fluids were discontinued and he was transitioned to an oral analgesic regimen  By 07/18/2019, he is deemed stable for discharge home with recommendations for short-term follow-up to arrange for an elective colonoscopy for further evaluation  On discharge, the patient is instructed to follow-up with the patient's primary care provider within the next 2 weeks to review the events of the recent hospitalization  The patient is instructed to follow-up with the Acute Care Surgical Services as scheduled on 08/01/2019 at 10:00 AM  The patient is instructed to follow the provided discharge instructions       Condition at Discharge: fair     Discharge instructions/Information to patient and family:   See after visit summary for information provided to patient and family  Provisions for Follow-Up Care:  See after visit summary for information related to follow-up care and any pertinent home health orders  Disposition: See After Visit Summary for discharge disposition information  Planned Readmission: Yes, will need eventual elective colonoscopy, and may require eventual operative intervention pending further workup  Discharge Statement   I spent 27 minutes discharging the patient  This time was spent on the day of discharge  I had direct contact with the patient on the day of discharge  Additional documentation is required if more than 30 minutes were spent on discharge  Discharge Medications:  See after visit summary for reconciled discharge medications provided to patient and family      Mark Cheng PA-C  7/18/2019  12:52 PM

## 2019-07-18 NOTE — PLAN OF CARE
Problem: Potential for Falls  Goal: Patient will remain free of falls  Description  INTERVENTIONS:  - Assess patient frequently for physical needs  -  Identify cognitive and physical deficits and behaviors that affect risk of falls  -  [place] fall precautions as indicated by assessment   - Educate patient/family on patient safety including physical limitations  - Instruct patient to call for assistance with activity based on assessment  - Modify environment to reduce risk of injury  - Consider OT/PT consult to assist with strengthening/mobility  Outcome: Adequate for Discharge     Problem: Nutrition/Hydration-ADULT  Goal: Nutrient/Hydration intake appropriate for improving, restoring or maintaining nutritional needs  Description  Monitor and assess patient's nutrition/hydration status for malnutrition (ex- brittle hair, bruises, dry skin, pale skin and conjunctiva, muscle wasting, smooth red tongue, and disorientation)  Collaborate with interdisciplinary team and initiate plan and interventions as ordered  Monitor patient's weight and dietary intake as ordered or per policy  Utilize nutrition screening tool and intervene per policy  Determine patient's food preferences and provide high-protein, high-caloric foods as appropriate       INTERVENTIONS:  - Monitor oral intake, urinary output, labs, and treatment plans  - Assess nutrition and hydration status and recommend course of action  - Evaluate amount of meals eaten  - Assist patient with eating if necessary   - Allow adequate time for meals  - Recommend/ encourage appropriate diets, oral nutritional supplements, and vitamin/mineral supplements  - Order, calculate, and assess calorie counts as needed  - Recommend, monitor, and adjust tube feedings and TPN/PPN based on assessed needs  - Assess need for intravenous fluids  - Provide specific nutrition/hydration education as appropriate  - Include patient/family/caregiver in decisions related to nutrition  Outcome: Adequate for Discharge

## 2019-07-18 NOTE — PROGRESS NOTES
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    Plan:  --Clears, ADAT  --Continue Abx  --Await return of bowel function  --Serial abdominal exams  --Will need eventual colonoscopy once acute inflammation resolves    Subjective/Objective     Subjective:     No acute events overnight  Pt c/o bilateral lower abdominal pain this AM, but pain is improved from yesterday  Denies any episodes of N/V/D  Passing flatus, no bowel movement in past 24h  Objective:     Blood pressure 140/88, pulse 99, temperature (!) 100 7 °F (38 2 °C), resp  rate 17, height 5' 10" (1 778 m), weight 90 7 kg (200 lb), SpO2 96 %  ,Body mass index is 28 7 kg/m²  Intake/Output Summary (Last 24 hours) at 7/18/2019 0549  Last data filed at 7/17/2019 1701  Gross per 24 hour   Intake 1555 ml   Output 200 ml   Net 1355 ml       Invasive Devices     Peripheral Intravenous Line            Peripheral IV 07/16/19 Right Antecubital 1 day                Physical Exam:    GEN: NAD  HEENT: MMM  CV: RRR  Lung: normal effort  Ab: Soft, ND, mild LLQ tenderness  Extrem: No CCE  Neuro:  A+Ox3, motor and sensation grossly intact    Lab, Imaging and other studies:CBC: No results found for: WBC, HGB, HCT, MCV, PLT, ADJUSTEDWBC, MCH, MCHC, RDW, MPV, NRBC, CMP: No results found for: SODIUM, K, CL, CO2, ANIONGAP, BUN, CREATININE, GLUCOSE, CALCIUM, AST, ALT, ALKPHOS, PROT, BILITOT, EGFR, Coagulation: No results found for: PT, INR, APTT, Urinalysis: No results found for: COLORU, CLARITYU, SPECGRAV, PHUR, LEUKOCYTESUR, NITRITE, PROTEINUA, GLUCOSEU, KETONESU, BILIRUBINUR, BLOODU, Amylase: No results found for: AMYLASE, Lipase: No results found for: LIPASE  VTE Pharmacologic Prophylaxis: Heparin  VTE Mechanical Prophylaxis: sequential compression device

## 2019-07-19 ENCOUNTER — TELEPHONE (OUTPATIENT)
Dept: GASTROENTEROLOGY | Facility: CLINIC | Age: 49
End: 2019-07-19

## 2019-07-22 ENCOUNTER — TELEPHONE (OUTPATIENT)
Dept: GASTROENTEROLOGY | Facility: AMBULARY SURGERY CENTER | Age: 49
End: 2019-07-22

## 2019-07-22 NOTE — TELEPHONE ENCOUNTER
DR YOUSSEF'S PT    Pt spouse called requesting to schedule a procedure at the [facility] only  Caller stated she prefer not to schedule at [facility]   Please assist thank you

## 2019-08-06 ENCOUNTER — TELEPHONE (OUTPATIENT)
Dept: GASTROENTEROLOGY | Facility: CLINIC | Age: 49
End: 2019-08-06

## 2019-08-06 NOTE — TELEPHONE ENCOUNTER
Pt said he needed to r/s colonoscopy, I dont know if still needs EGD  Pt wasn't very clear as to why he was suppose to have colon in first place

## 2019-08-07 ENCOUNTER — ANESTHESIA EVENT (OUTPATIENT)
Dept: GASTROENTEROLOGY | Facility: HOSPITAL | Age: 49
End: 2019-08-07

## 2019-08-08 ENCOUNTER — HOSPITAL ENCOUNTER (OUTPATIENT)
Dept: GASTROENTEROLOGY | Facility: HOSPITAL | Age: 49
Setting detail: OUTPATIENT SURGERY
Discharge: HOME | End: 2019-08-08
Attending: INTERNAL MEDICINE

## 2019-08-08 ENCOUNTER — ANESTHESIA (OUTPATIENT)
Dept: GASTROENTEROLOGY | Facility: HOSPITAL | Age: 49
End: 2019-08-08

## 2019-08-08 VITALS
TEMPERATURE: 97.9 F | HEART RATE: 74 BPM | OXYGEN SATURATION: 97 % | SYSTOLIC BLOOD PRESSURE: 106 MMHG | BODY MASS INDEX: 25.72 KG/M2 | DIASTOLIC BLOOD PRESSURE: 70 MMHG | WEIGHT: 179.68 LBS | HEIGHT: 70 IN | RESPIRATION RATE: 18 BRPM

## 2019-08-08 DIAGNOSIS — R10.30 LOWER ABDOMINAL PAIN: ICD-10-CM

## 2019-08-08 DIAGNOSIS — K62.5 RECTAL BLEEDING: ICD-10-CM

## 2019-08-08 PROCEDURE — 45378 DIAGNOSTIC COLONOSCOPY: CPT | Performed by: INTERNAL MEDICINE

## 2019-08-08 RX ORDER — PROPOFOL 10 MG/ML
INJECTION, EMULSION INTRAVENOUS AS NEEDED
Status: DISCONTINUED | OUTPATIENT
Start: 2019-08-08 | End: 2019-08-08 | Stop reason: SURG

## 2019-08-08 RX ORDER — LIDOCAINE HYDROCHLORIDE 10 MG/ML
INJECTION, SOLUTION EPIDURAL; INFILTRATION; INTRACAUDAL; PERINEURAL AS NEEDED
Status: DISCONTINUED | OUTPATIENT
Start: 2019-08-08 | End: 2019-08-08 | Stop reason: SURG

## 2019-08-08 RX ORDER — SODIUM CHLORIDE, SODIUM LACTATE, POTASSIUM CHLORIDE, CALCIUM CHLORIDE 600; 310; 30; 20 MG/100ML; MG/100ML; MG/100ML; MG/100ML
125 INJECTION, SOLUTION INTRAVENOUS CONTINUOUS
Status: DISCONTINUED | OUTPATIENT
Start: 2019-08-08 | End: 2019-08-12 | Stop reason: HOSPADM

## 2019-08-08 RX ADMIN — PROPOFOL 30 MG: 10 INJECTION, EMULSION INTRAVENOUS at 10:18

## 2019-08-08 RX ADMIN — PROPOFOL 30 MG: 10 INJECTION, EMULSION INTRAVENOUS at 10:16

## 2019-08-08 RX ADMIN — SODIUM CHLORIDE, SODIUM LACTATE, POTASSIUM CHLORIDE, AND CALCIUM CHLORIDE 125 ML/HR: .6; .31; .03; .02 INJECTION, SOLUTION INTRAVENOUS at 09:51

## 2019-08-08 RX ADMIN — PROPOFOL 30 MG: 10 INJECTION, EMULSION INTRAVENOUS at 10:15

## 2019-08-08 RX ADMIN — LIDOCAINE HYDROCHLORIDE 50 MG: 10 INJECTION, SOLUTION EPIDURAL; INFILTRATION; INTRACAUDAL; PERINEURAL at 10:11

## 2019-08-08 RX ADMIN — PROPOFOL 150 MG: 10 INJECTION, EMULSION INTRAVENOUS at 10:11

## 2019-08-08 RX ADMIN — PROPOFOL 30 MG: 10 INJECTION, EMULSION INTRAVENOUS at 10:21

## 2019-08-08 RX ADMIN — PROPOFOL 30 MG: 10 INJECTION, EMULSION INTRAVENOUS at 10:13

## 2019-08-08 RX ADMIN — PROPOFOL 30 MG: 10 INJECTION, EMULSION INTRAVENOUS at 10:24

## 2019-08-08 NOTE — ANESTHESIA POSTPROCEDURE EVALUATION
Post-Op Assessment Note    CV Status:  Stable  Pain Score: 0       Mental Status:  Awake   Hydration Status:  Stable   PONV Controlled:  None   Airway Patency:  Patent and adequate   Post Op Vitals Reviewed: Yes      Staff: CRNA           BP   113/63   Temp 99 6   Pulse  78   Resp   20   SpO2   96

## 2019-08-08 NOTE — DISCHARGE INSTRUCTIONS
Colonoscopy   WHAT YOU NEED TO KNOW:   A colonoscopy is a procedure to examine the inside of your colon (intestine) with a scope  Polyps or tissue growths may have been removed during your colonoscopy  It is normal to feel bloated and to have some abdominal discomfort  You should be passing gas  If you have hemorrhoids or you had polyps removed, you may have a small amount of bleeding  DISCHARGE INSTRUCTIONS:   Seek care immediately if:   · You have a large amount of bright red blood in your bowel movements  · Your abdomen is hard and firm and you have severe pain  · You have sudden trouble breathing  Contact your healthcare provider if:   · You develop a rash or hives  · You have a fever within 24 hours of your procedure  · You have not had a bowel movement for 3 days after your procedure  · You have questions or concerns about your condition or care  Activity:   · Do not lift, strain, or run  for 3 days after your procedure  · Rest after your procedure  You have been given medicine to relax you  Do not  drive or make important decisions until the day after your procedure  Return to your normal activity as directed  · Relieve gas and discomfort from bloating  by lying on your right side with a heating pad on your abdomen  You may need to take short walks to help the gas move out  Eat small meals until bloating is relieved  If you had polyps removed: For 7 days after your procedure:  · Do not  take aspirin  · Do not  go on long car rides  Help prevent constipation:   · Eat a variety of healthy foods  Healthy foods include fruit, vegetables, whole-grain breads, low-fat dairy products, beans, lean meat, and fish  Ask if you need to be on a special diet  Your healthcare provider may recommend that you eat high-fiber foods such as cooked beans  Fiber helps you have regular bowel movements  · Drink liquids as directed    Adults should drink between 9 and 13 eight-ounce cups of liquid every day  Ask what amount is best for you  For most people, good liquids to drink are water, juice, and milk  · Exercise as directed  Talk to your healthcare provider about the best exercise plan for you  Exercise can help prevent constipation, decrease your blood pressure and improve your health  Follow up with your healthcare provider as directed:  Write down your questions so you remember to ask them during your visits  © 2017 [employer] Information is for End User's use only and may not be sold, redistributed or otherwise used for commercial purposes  All illustrations and images included in CareNotes® are the copyrighted property of [employer]  or David Am[employer]  The above information is an  only  It is not intended as medical advice for individual conditions or treatments  Talk to your doctor, nurse or pharmacist before following any medical regimen to see if it is safe and effective for you

## 2019-08-08 NOTE — H&P
History and Physical -  [facility]  Laydelma Mejia Sr 54 y o  male MRN: [id number]                  HPI: Isatu Kent is a 54y o  year old male who presents for colonoscopy for lower abdominal pain, rectal bleeding, abnormal CT with diverticulitis and perforation      REVIEW OF SYSTEMS: Per the HPI, and otherwise unremarkable  Historical Information   Past Medical History:   Diagnosis Date    Asthma     Diverticulitis 07/2019     Past Surgical History:   Procedure Laterality Date    ANKLE SURGERY      COLONOSCOPY  2005    LEG SURGERY       Social History   Social History     Substance and Sexual Activity   Alcohol Use Not Currently    Frequency: Never    Comment: occ     Social History     Substance and Sexual Activity   Drug Use Never     Social History     Tobacco Use   Smoking Status Current Every Day Smoker    Packs/day: 1 00    Types: Cigarettes   Smokeless Tobacco Never Used     Family History   Problem Relation Age of Onset   Vitaliy Palacios Parkinsonism Mother    Vitaliy Palacios Diabetes Father     Breast cancer Sister     Lung cancer Sister        Meds/Allergies       (Not in a hospital admission)    No Known Allergies    Objective     Blood pressure 118/71, pulse 80, temperature 98 °F (36 7 °C), temperature source Oral, resp  rate 18, height 5' 10" (1 778 m), weight 81 5 kg (179 lb 10 8 oz), SpO2 99 %        PHYSICAL EXAM    Gen: NAD  CV: RRR  CHEST: Clear  ABD: soft, NT/ND  EXT: no edema  Neuro: AAO      ASSESSMENT/PLAN:  This is a 54y o  year old male here for lower abdominal pain, rectal bleeding, abnormal CT scan    PLAN:   Procedure:  Colonoscopy

## 2019-08-08 NOTE — ANESTHESIA PREPROCEDURE EVALUATION
Review of Systems/Medical History          Cardiovascular   Pulmonary  Smoker Cumulative Pack Years: 27, Asthma , well controlled/ stable ,        GI/Hepatic            Endo/Other     GYN       Hematology   Musculoskeletal       Neurology   Psychology                Anesthesia Plan  ASA Score- 2     Anesthesia Type- IV sedation with anesthesia with ASA Monitors  Additional Monitors:   Airway Plan:         Plan Factors-    Induction- intravenous  Postoperative Plan-     Informed Consent- Anesthetic plan and risks discussed with patient  I personally reviewed this patient with the CRNA  Discussed and agreed on the Anesthesia Plan with the CRNA  Linsey Rizzo

## 2021-07-02 ENCOUNTER — APPOINTMENT (OUTPATIENT)
Dept: RADIOLOGY | Facility: MEDICAL CENTER | Age: 51
End: 2021-07-02
Payer: COMMERCIAL

## 2021-07-02 DIAGNOSIS — G56.21 LESION OF RIGHT ULNAR NERVE: ICD-10-CM

## 2021-07-02 PROCEDURE — 73070 X-RAY EXAM OF ELBOW: CPT
